# Patient Record
Sex: MALE | Race: WHITE
[De-identification: names, ages, dates, MRNs, and addresses within clinical notes are randomized per-mention and may not be internally consistent; named-entity substitution may affect disease eponyms.]

---

## 2019-10-22 ENCOUNTER — HOSPITAL ENCOUNTER (EMERGENCY)
Dept: HOSPITAL 97 - ER | Age: 74
Discharge: HOME | End: 2019-10-22
Payer: COMMERCIAL

## 2019-10-22 VITALS — OXYGEN SATURATION: 100 %

## 2019-10-22 VITALS — TEMPERATURE: 98.6 F

## 2019-10-22 VITALS — DIASTOLIC BLOOD PRESSURE: 65 MMHG | SYSTOLIC BLOOD PRESSURE: 130 MMHG

## 2019-10-22 DIAGNOSIS — Z79.01: ICD-10-CM

## 2019-10-22 DIAGNOSIS — Z86.73: ICD-10-CM

## 2019-10-22 DIAGNOSIS — R20.2: Primary | ICD-10-CM

## 2019-10-22 LAB
BUN BLD-MCNC: 14 MG/DL (ref 7–18)
GLUCOSE SERPLBLD-MCNC: 94 MG/DL (ref 74–106)
HCT VFR BLD CALC: 38.5 % (ref 39.6–49)
LYMPHOCYTES # SPEC AUTO: 1.2 K/UL (ref 0.7–4.9)
MAGNESIUM SERPL-MCNC: 2.1 MG/DL (ref 1.8–2.4)
PMV BLD: 7.4 FL (ref 7.6–11.3)
POTASSIUM SERPL-SCNC: 4.2 MMOL/L (ref 3.5–5.1)
RBC # BLD: 4.49 M/UL (ref 4.33–5.43)
TROPONIN (EMERG DEPT USE ONLY): < 0.02 NG/ML (ref 0–0.04)

## 2019-10-22 PROCEDURE — 93005 ELECTROCARDIOGRAM TRACING: CPT

## 2019-10-22 PROCEDURE — 84484 ASSAY OF TROPONIN QUANT: CPT

## 2019-10-22 PROCEDURE — 70450 CT HEAD/BRAIN W/O DYE: CPT

## 2019-10-22 PROCEDURE — 85025 COMPLETE CBC W/AUTO DIFF WBC: CPT

## 2019-10-22 PROCEDURE — 36415 COLL VENOUS BLD VENIPUNCTURE: CPT

## 2019-10-22 PROCEDURE — 99284 EMERGENCY DEPT VISIT MOD MDM: CPT

## 2019-10-22 PROCEDURE — 83735 ASSAY OF MAGNESIUM: CPT

## 2019-10-22 PROCEDURE — 70551 MRI BRAIN STEM W/O DYE: CPT

## 2019-10-22 PROCEDURE — 80048 BASIC METABOLIC PNL TOTAL CA: CPT

## 2019-10-22 PROCEDURE — 72125 CT NECK SPINE W/O DYE: CPT

## 2019-10-22 NOTE — RAD REPORT
EXAM DESCRIPTION:  MRI - Brain Wo Cont - 10/22/2019 6:02 pm

 

CLINICAL HISTORY:   Right arm numbness

 

COMPARISON:  Head CT October 22, 2018

 

TECHNIQUE:  Axial, sagittal, and coronal magnetic images of the brain were obtained. Contrast was not
 requested

 

FINDINGS:  Large area of cystic encephalomalacia left occipital lobe secondary to an old infarct.

 

Diffusion-weighted/ADC mapping does not reveal evidence of acute infarction.

 

The ventricles are normal caliber.

 

An extra-axial fluid collection is not present

 

The sinuses and mastoids are clear.

 

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

## 2019-10-22 NOTE — ER
Nurse's Notes                                                                                     

 [facility]                                                                 

Name: Anthony Simental                                                                                

Age: 73 yrs                                                                                       

Sex: Male                                                                                         

: 1945                                                                                   

MRN: [id number]                                                                                   

Arrival Date: 10/22/2019                                                                          

Time: 15:52                                                                                       

Account#: [id number]                                                                            

Bed 26                                                                                            

Private MD:                                                                                       

Diagnosis: Paresthesia of skin                                                                    

                                                                                                  

Presentation:                                                                                     

10/22                                                                                             

15:57 Presenting complaint: Patient states: for the last 3-4 days I have been having tingling la1 

      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       

      few years ago. VAN negative, taking warfarin. Transition of care: patient was not           

      received from another setting of care. Onset of symptoms was 2019. Risk         

      Assessment: Do you want to hurt yourself or someone else? Patient reports no desire to      

      harm self or others. Initial Sepsis Screen: Does the patient meet any 2 criteria? No.       

      Patient's initial sepsis screen is negative. Does the patient have a suspected source       

      of infection? No. Patient's initial sepsis screen is negative. Care prior to arrival:       

      None.                                                                                       

15:57 Method Of Arrival: Ambulatory                                                           la1 

15:57 Acuity: JUAN C 3                                                                           la1 

                                                                                                  

Historical:                                                                                       

- Allergies:                                                                                      

15:58 No Known Allergies;                                                                     la1 

- PMHx:                                                                                           

15:58 CVA;                                                                                    la1 

                                                                                                  

- Immunization history:: Adult Immunizations up to date.                                          

- Social history:: Smoking status: Patient/guardian denies using tobacco.                         

- Ebola Screening: : No symptoms or risks identified at this time.                                

- Family history:: not pertinent.                                                                 

- Hospitalizations: : No recent hospitalization is reported.                                      

                                                                                                  

                                                                                                  

Screenin:11 Abuse screen: Denies threats or abuse. Denies injuries from another. Nutritional        rv  

      screening: No deficits noted. Tuberculosis screening: No symptoms or risk factors           

      identified. Fall Risk None identified.                                                      

                                                                                                  

Assessment:                                                                                       

17:10 General: Appears in no apparent distress. comfortable, Behavior is calm, cooperative.   rv  

      Pain: Denies pain. Neuro: Level of Consciousness is awake, alert, obeys commands,           

      Oriented to person, place, time, situation. Neuro: Reports tingling sensation, right.       

      Cardiovascular: Patient's skin is warm and dry. Respiratory: Airway is patent. GI: No       

      signs and/or symptoms were reported involving the gastrointestinal system. : No signs     

      and/or symptoms were reported regarding the genitourinary system. EENT: No signs and/or     

      symptoms were reported regarding the EENT system. Derm: Skin is intact.                     

      Musculoskeletal: No signs and/or symptoms reported regarding the musculoskeletal system.    

18:34 Reassessment: Patient appears in no apparent distress at this time. Patient and/or      rv  

      family updated on plan of care and expected duration. Pain level reassessed. Patient is     

      alert, oriented x 3, equal unlabored respirations, skin warm/dry/pink.                      

18:34 General: Appears in no apparent distress. comfortable, Behavior is calm, cooperative.   rv  

      Neuro: No deficits noted.                                                                   

                                                                                                  

Vital Signs:                                                                                      

15:59  / 68; Pulse 80; Resp 16; Temp 98.6; Pulse Ox 100% on R/A; Weight 140.61 kg;      la1 

      Height 6 ft. 0 in. (182.88 cm);                                                             

17:12  / 64; Pulse 62; Resp 9; Pulse Ox 96% on R/A;                                     rv  

18:00  / 66; Pulse 65; Resp 15; Pulse Ox 100% on R/A;                                   rv  

18:30  / 65; Pulse 64; Resp 16; Pulse Ox 100% on R/A;                                   rv  

15:59 Body Mass Index 42.04 (140.61 kg, 182.88 cm)                                            la1 

                                                                                                  

ED Course:                                                                                        

15:52 Patient arrived in ED.                                                                  as  

15:58 Triage completed.                                                                       la1 

15:59 Arm band placed on right wrist.                                                         la1 

16:31 Immanuel Robin MD is Attending Physician.                                                rn  

16:32 Carlos Rosenbaum RN is Primary Nurse.                                                  rv  

17:03 EKG done, by EKG tech. reviewed by Immanuel Robin MD.                                      sm3 

17:11 Patient has correct armband on for positive identification. Bed in low position. Call   rv  

      light in reach. Side rails up X 1. Cardiac monitor on. Pulse ox on. NIBP on.                

17:11 Inserted saline lock: 20 gauge in left antecubital area, using aseptic technique. Blood rv  

      collected.                                                                                  

17:40 CT Head C Spine In Process Unspecified.                                                 EDMS

17:57 Brain Wo Cont MRI In Process Unspecified.                                               EDMS

18:35 No provider procedures requiring assistance completed. IV discontinued, intact,         rv  

      bleeding controlled, No redness/swelling at site. Pressure dressing applied.                

                                                                                                  

Administered Medications:                                                                         

No medications were administered                                                                  

                                                                                                  

                                                                                                  

Outcome:                                                                                          

18:28 Discharge ordered by MD.                                                                rn  

18:35 Discharged to home ambulatory.                                                          rv  

18:35 Condition: good                                                                             

18:35 Discharge instructions given to patient, Instructed on discharge instructions, follow       

      up and referral plans. medication usage, Demonstrated understanding of instructions,        

      follow-up care, medications, Prescriptions given X 1.                                       

18:39 Patient left the ED.                                                                    rv  

                                                                                                  

Signatures:                                                                                       

Dispatcher [employer]                           Leidy Silver Roman, MD MD rn Attema, Lee, RN                         RN   Mary Comer                              3                                                  

Carlos Rosenbaum RN                    RN   rv                                                   

                                                                                                  

**************************************************************************************************

## 2019-10-22 NOTE — EDPHYS
Physician Documentation                                                                           

 [facility]                                                                 

Name: Anthony Simental                                                                                

Age: 73 yrs                                                                                       

Sex: Male                                                                                         

: 1945                                                                                   

MRN: [id number]                                                                                   

Arrival Date: 10/22/2019                                                                          

Time: 15:52                                                                                       

Account#: [id number]                                                                            

Bed 26                                                                                            

Private MD:                                                                                       

ED Physician Immanuel Robin                                                                         

HPI:                                                                                              

10/22                                                                                             

16:47 This 73 yrs old  Male presents to ER via Ambulatory with complaints of r side  rn  

      tingling.                                                                                   

16:47 The patient presents to the emergency department with paresthesias of the right lower   rn  

      extremity, that is mild, right upper extremity, that is mild. Onset: The                    

      symptoms/episode began/occurred 3 day(s) ago. Context: occurred while the patient was       

      at rest. Severity of symptoms: At their worst the symptoms were mild in the emergency       

      department the symptoms are unchanged. Current symptoms: paresthesias. The patient has      

      experienced a previous episode. The patient has been recently seen by a physician:.         

      Reports right arm and leg tingling/tightness, over last 3 days, reports always there to     

      an extent but seems to "move around", denies chest pain/sob/head injury. Takes warfarin     

      for several previous strokes, only numbness to right leg is residual. Reports has never     

      had it affect right arm before. No weakness. No coordination issues. .                      

                                                                                                  

Historical:                                                                                       

- Allergies:                                                                                      

15:58 No Known Allergies;                                                                     la1 

- PMHx:                                                                                           

15:58 CVA;                                                                                    la1 

                                                                                                  

- Immunization history:: Adult Immunizations up to date.                                          

- Social history:: Smoking status: Patient/guardian denies using tobacco.                         

- Ebola Screening: : No symptoms or risks identified at this time.                                

- Family history:: not pertinent.                                                                 

- Hospitalizations: : No recent hospitalization is reported.                                      

                                                                                                  

                                                                                                  

ROS:                                                                                              

16:50 Constitutional: Negative for fever, chills, and weight loss, Eyes: Negative for injury, rn  

      pain, redness, and discharge, Neck: Negative for injury, pain, and swelling,                

      Cardiovascular: Negative for chest pain, palpitations, and edema, Respiratory: Negative     

      for shortness of breath, cough, wheezing, and pleuritic chest pain, Abdomen/GI:             

      Negative for abdominal pain, nausea, vomiting, diarrhea, and constipation,                  

      MS/Extremity: Negative for injury and deformity, Neuro: Negative for headache,              

      weakness, and seizure.                                                                      

                                                                                                  

Exam:                                                                                             

16:50 Constitutional:  This is a well developed, well nourished patient who is awake, alert,  rn  

      and in no acute distress. Head/Face:  Normocephalic, atraumatic. ENT:  MMM Neck:            

      Trachea midline, no thyromegaly or masses palpated, and no cervical lymphadenopathy.        

      Supple, full range of motion without nuchal rigidity, or vertebral point tenderness.        

      No Meningismus. Cardiovascular:  Regular rate and rhythm.  No pulse deficits.               

      Respiratory:  No increased work of breathing, no retractions or nasal flaring.              

      Abdomen/GI:  soft, non-tender, + moderate ventral hernia  MS/ Extremity:  Pulses equal,     

      no cyanosis.  Neurovascular intact.  Full, normal range of motion.  Equal                   

      circumference. Neuro:  Awake and alert, GCS 15, oriented to person, place, time, and        

      situation.  Cranial nerves II-XII grossly intact.  Motor strength 5/5 in all                

      extremities. Mild decreased sensation to soft touch RUE/RLE.  Cerebellar exam normal.       

      Normal gait.                                                                                

18:17 ECG was reviewed by the Attending Physician.                                            rn  

                                                                                                  

Vital Signs:                                                                                      

15:59  / 68; Pulse 80; Resp 16; Temp 98.6; Pulse Ox 100% on R/A; Weight 140.61 kg;      la1 

      Height 6 ft. 0 in. (182.88 cm);                                                             

17:12  / 64; Pulse 62; Resp 9; Pulse Ox 96% on R/A;                                     rv  

18:00  / 66; Pulse 65; Resp 15; Pulse Ox 100% on R/A;                                   rv  

18:30  / 65; Pulse 64; Resp 16; Pulse Ox 100% on R/A;                                   rv  

15:59 Body Mass Index 42.04 (140.61 kg, 182.88 cm)                                            la1 

                                                                                                  

MDM:                                                                                              

16:31 Patient medically screened.                                                             rn  

18:25 Data reviewed: vital signs, nurses notes, lab test result(s), EKG, radiologic studies,  rn  

      CT scan, MRI, and as a result, I will discharge patient. Counseling: I had a detailed       

      discussion with the patient and/or guardian regarding: the historical points, exam          

      findings, and any diagnostic results supporting the discharge/admit diagnosis, lab          

      results, radiology results, the need for outpatient follow up, to return to the             

      emergency department if symptoms worsen or persist or if there are any questions or         

      concerns that arise at home. Response to treatment: the patient's symptoms have mildly      

      improved after treatment, and as a result, I will discharge patient. Special                

      discussion: I discussed with the patient/guardian in detail that at this point there is     

      no indication for admission to the hospital. It is understood, however, that if the         

      symptoms persist or worsen the patient needs to return immediately for re-evaluation.       

      Based on the history and exam findings, there is no indication for further emergent         

      testing or inpatient evaluation. I discussed with the patient/guardian the need to see      

      the neurologist for further evaluation of the symptoms. ED course: Pt with vague            

      symptoms that patient states not same as previous strokes. CT head and cspine without       

      acute findings, + spondylosis of cervical vertebrae that could explain right arm            

      paresthesias. Patient does states have residual symptoms in right leg from previous         

      CVA. MRI negative for acute stroke. Will dc home with steroids and neuro f/u for            

      possible neuropathy or radiculopathy. .                                                     

                                                                                                  

10/22                                                                                             

16:44 Order name: Troponin (emerg Dept Use Only); Complete Time: 18:16                        rn  

10/22                                                                                             

16:44 Order name: Basic Metabolic Panel; Complete Time: 18:16                                 rn  

10/22                                                                                             

16:44 Order name: CBC with Diff; Complete Time: 18:16                                         rn  

10/22                                                                                             

16:44 Order name: Magnesium; Complete Time: 18:16                                             rn  

10/22                                                                                             

16:44 Order name: EKG; Complete Time: 16:44                                                   rn  

10/22                                                                                             

16:44 Order name: Cardiac monitoring; Complete Time: 17:06                                    rn  

10/22                                                                                             

16:44 Order name: EKG - Nurse/Tech; Complete Time: 17:06                                      rn  

10/22                                                                                             

16:44 Order name: IV Saline Lock; Complete Time: 17:06                                        rn  

10/22                                                                                             

16:44 Order name: O2 Sat Monitoring; Complete Time: 17:06                                     rn  

10/22                                                                                             

16:44 Order name: Brain Wo Cont MRI; Complete Time: 18:25                                     rn  

10/22                                                                                             

16:50 Order name: CT Head C Spine; Complete Time: 18:16                                       rn  

                                                                                                  

EC:17 Rate is 66 beats/min. Rhythm is regular. QRS Axis is Normal. WV interval is normal. QRS rn  

      interval is normal. QT interval is normal. No Q waves. T waves are Normal. No ST            

      changes noted. Clinical impression: NSR w/ Non-specific ST/T Changes. Interpreted by        

      me. Reviewed by me.                                                                         

                                                                                                  

Administered Medications:                                                                         

No medications were administered                                                                  

                                                                                                  

                                                                                                  

Disposition:                                                                                      

10/22/19 18:28 Discharged to Home. Impression: Paresthesia of skin.                               

- Condition is Stable.                                                                            

- Discharge Instructions: Paresthesia.                                                            

- Prescriptions for Medrol (Dave) 4 mg Oral Tablets, Dose Pack - take 1 tablet by ORAL             

  route as directed - follow package instructions; 1 packet.                                      

- Medication Reconciliation Form, Thank You Letter, Antibiotic Education, Prescription            

  Opioid Use form.                                                                                

- Follow up: Private Physician; When: As needed; Reason: Recheck today's complaints,              

  Re-evaluation by your physician.                                                                

- Problem is new.                                                                                 

- Symptoms have improved.                                                                         

                                                                                                  

                                                                                                  

                                                                                                  

Signatures:                                                                                       

Dispatcher [employer]                           EDMS                                                 

Immanuel Robin MD MD   rn                                                   

Jett Smith RN                         RN   la1                                                  

Carlos Rosenbaum RN                    RN   rv                                                   

                                                                                                  

Corrections: (The following items were deleted from the chart)                                    

16:53 16:44 PROTIME (+INR)+COAG.LAB.BRZ ordered. EDMS                                         EDMS

16:54 16:45 Head Brain Wo Cont+CT.RAD.BRZ ordered. [facility]                                       EDMS

18:39 18:28 10/22/2019 18:28 Discharged to Home. Impression: Paresthesia of skin. Condition   rv  

      is Stable. Forms are Medication Reconciliation Form, Thank You Letter, Antibiotic           

      Education, Prescription Opioid Use. Follow up: Private Physician; When: As needed;          

      Reason: Recheck today's complaints, Re-evaluation by your physician. Problem is new.        

      Symptoms have improved. rn                                                                  

                                                                                                  

**************************************************************************************************

## 2019-10-22 NOTE — EKG
Test Date:    2019-10-22               Test Time:    16:51:21

Technician:   THUY                                     

                                                     

MEASUREMENT RESULTS:                                       

Intervals:                                           

Rate:         66                                     

NJ:           174                                    

QRSD:         70                                     

QT:           400                                    

QTc:          419                                    

Axis:                                                

P:            23                                     

NJ:           174                                    

QRS:          28                                     

T:            52                                     

                                                     

INTERPRETIVE STATEMENTS:                                       

                                                     

Normal sinus rhythm

Low voltage QRS

Borderline ECG

Compared to ECG 05/28/2015 10:15:56

Low QRS voltage now present

Ventricular premature complex(es) no longer present

T-wave abnormality no longer present



Electronically Signed On 10-22-19 17:30:21 CDT by Suraj Thibodeaux

## 2019-10-22 NOTE — RAD REPORT
EXAM DESCRIPTION:  CT - Head C Spine Mpr Wo Con - 10/22/2019 5:39 pm

 

CLINICAL HISTORY:  Right sided numbness

 

COMPARISON:  2015 MRI brain

 

TECHNIQUE:  Computed axial tomography of the head and cervical spine was obtained.

 

Sagittal and coronal reconstruction was performed.

 

All CT scans are performed using dose optimization technique as appropriate and may include automated
 exposure control or mA/KV adjustment according to patient size.

 

FINDINGS:  Large area of cystic encephalomalacia left cerebellum secondary to an old infarction.

 

An intracranial bleed is not seen. Ventricles are normal caliber. No extra-axial fluid collection.

 

Fluid within the sinuses/mastoids is not noted

 

A cervical fracture is not visualized. No dislocation is noted. Spondylosis C5-6 and C6-7. Moderate n
arrowing of the right neural foramina C5-6. Moderate narrowing of the neural foramina bilaterally C6-
7

 

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

 

A cervical fracture is not visualized.

 

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

## 2020-02-01 ENCOUNTER — HOSPITAL ENCOUNTER (OUTPATIENT)
Dept: HOSPITAL 97 - ER | Age: 75
Setting detail: OBSERVATION
LOS: 1 days | Discharge: HOME | End: 2020-02-02
Attending: INTERNAL MEDICINE | Admitting: INTERNAL MEDICINE
Payer: COMMERCIAL

## 2020-02-01 VITALS — BODY MASS INDEX: 42.3 KG/M2

## 2020-02-01 VITALS — OXYGEN SATURATION: 95 %

## 2020-02-01 DIAGNOSIS — I10: ICD-10-CM

## 2020-02-01 DIAGNOSIS — L03.115: Primary | ICD-10-CM

## 2020-02-01 DIAGNOSIS — I69.351: ICD-10-CM

## 2020-02-01 LAB
ALBUMIN SERPL BCP-MCNC: 3.4 G/DL (ref 3.4–5)
ALP SERPL-CCNC: 80 U/L (ref 45–117)
ALT SERPL W P-5'-P-CCNC: 21 U/L (ref 12–78)
AST SERPL W P-5'-P-CCNC: 18 U/L (ref 15–37)
BUN BLD-MCNC: 21 MG/DL (ref 7–18)
GLUCOSE SERPLBLD-MCNC: 96 MG/DL (ref 74–106)
HCT VFR BLD CALC: 40.4 % (ref 39.6–49)
INR BLD: 2.78
INR BLD: 2.83
LYMPHOCYTES # SPEC AUTO: 1 K/UL (ref 0.7–4.9)
MAGNESIUM SERPL-MCNC: 2 MG/DL (ref 1.8–2.4)
NT-PROBNP SERPL-MCNC: 210 PG/ML (ref ?–125)
PMV BLD: 7.6 FL (ref 7.6–11.3)
POTASSIUM SERPL-SCNC: 4.1 MMOL/L (ref 3.5–5.1)
RBC # BLD: 4.64 M/UL (ref 4.33–5.43)
TROPONIN (EMERG DEPT USE ONLY): < 0.02 NG/ML (ref 0–0.04)

## 2020-02-01 PROCEDURE — 93005 ELECTROCARDIOGRAM TRACING: CPT

## 2020-02-01 PROCEDURE — 85610 PROTHROMBIN TIME: CPT

## 2020-02-01 PROCEDURE — 80053 COMPREHEN METABOLIC PANEL: CPT

## 2020-02-01 PROCEDURE — 84484 ASSAY OF TROPONIN QUANT: CPT

## 2020-02-01 PROCEDURE — 96368 THER/DIAG CONCURRENT INF: CPT

## 2020-02-01 PROCEDURE — 96365 THER/PROPH/DIAG IV INF INIT: CPT

## 2020-02-01 PROCEDURE — 81003 URINALYSIS AUTO W/O SCOPE: CPT

## 2020-02-01 PROCEDURE — 80048 BASIC METABOLIC PNL TOTAL CA: CPT

## 2020-02-01 PROCEDURE — 87040 BLOOD CULTURE FOR BACTERIA: CPT

## 2020-02-01 PROCEDURE — 84145 PROCALCITONIN (PCT): CPT

## 2020-02-01 PROCEDURE — 93970 EXTREMITY STUDY: CPT

## 2020-02-01 PROCEDURE — 71045 X-RAY EXAM CHEST 1 VIEW: CPT

## 2020-02-01 PROCEDURE — 36415 COLL VENOUS BLD VENIPUNCTURE: CPT

## 2020-02-01 PROCEDURE — 99285 EMERGENCY DEPT VISIT HI MDM: CPT

## 2020-02-01 PROCEDURE — 83880 ASSAY OF NATRIURETIC PEPTIDE: CPT

## 2020-02-01 PROCEDURE — 85025 COMPLETE CBC W/AUTO DIFF WBC: CPT

## 2020-02-01 PROCEDURE — 80076 HEPATIC FUNCTION PANEL: CPT

## 2020-02-01 PROCEDURE — 96366 THER/PROPH/DIAG IV INF ADDON: CPT

## 2020-02-01 PROCEDURE — 83735 ASSAY OF MAGNESIUM: CPT

## 2020-02-01 RX ADMIN — FAMOTIDINE SCH MG: 20 TABLET, FILM COATED ORAL at 20:34

## 2020-02-01 RX ADMIN — Medication SCH ML: at 20:33

## 2020-02-01 NOTE — ER
Nurse's Notes                                                                                     

 [facility]                                                                 

Name: Anthony Simental                                                                                

Age: 74 yrs                                                                                       

Sex: Male                                                                                         

: 1945                                                                                   

MRN: [id number]                                                                                   

Arrival Date: 2020                                                                          

Time: 14:45                                                                                       

Account#: [id number]                                                                            

Bed 24                                                                                            

Private MD: Ketan Messina T                                                                        

Diagnosis: Cellulitis and acute lymphangitis of other parts of limb-failed outpt                  

  treatment;Obesity, unspecified;Unspecified kidney failure                                       

                                                                                                  

Presentation:                                                                                     

                                                                                             

14:54 Presenting complaint: Worsening RLE redness and swelling x 1 week. Transition of care:  hb  

      patient was not received from another setting of care. Onset of symptoms was 2020. Risk Assessment: Do you want to hurt yourself or someone else? Patient            

      reports no desire to harm self or others. Care prior to arrival: None.                      

14:54 Method Of Arrival: Ambulatory                                                             

14:54 Acuity: JUAN C 3                                                                           hb  

14:54 Initial Sepsis Screen: Does the patient meet any 2 criteria? No. Patient's initial      hb  

      sepsis screen is negative. Does the patient have a suspected source of infection? No.       

      Patient's initial sepsis screen is negative.                                                

                                                                                                  

Historical:                                                                                       

- Allergies:                                                                                      

14:56 No Known Allergies;                                                                     hb  

- PMHx:                                                                                           

14:56 CVA;                                                                                    hb  

                                                                                                  

- Immunization history:: Adult Immunizations up to date.                                          

- Coronavirus screen:: The patient has NOT traveled to [place], [place], or [place] in the           

  past 14 days. The patient has NOT had contact with known/suspected case of                      

  Coronavirus? Proceed with normal triage procedures.                                             

- Social history:: Smoking status: Patient denies any tobacco usage or history of.                

- Family history:: not pertinent.                                                                 

- Ebola Screening: : No symptoms or risks identified at this time.                                

                                                                                                  

                                                                                                  

Screening:                                                                                        

15:06 Abuse screen: Denies threats or abuse. Denies injuries from another. Nutritional        mg2 

      screening: No deficits noted. Tuberculosis screening: No symptoms or risk factors           

      identified.                                                                                 

16:35 Fall Risk IV access (20 points).                                                        mg2 

                                                                                                  

Assessment:                                                                                       

16:32 General: Appears in no apparent distress. comfortable, Behavior is calm, cooperative.   mg2 

      Pain: Complains of pain in right leg. Neuro: Level of Consciousness is awake, alert,        

      obeys commands, Oriented to person, place, time, situation. Cardiovascular: Capillary       

      refill < 3 seconds Patient's skin is warm and dry. Respiratory: Airway is patent            

      Respiratory effort is even, unlabored, Respiratory pattern is regular, symmetrical. GI:     

      No signs and/or symptoms were reported involving the gastrointestinal system. : No        

      signs and/or symptoms were reported regarding the genitourinary system. EENT: No signs      

      and/or symptoms were reported regarding the EENT system. Derm: Skin is intact, is           

      healthy with good turgor, Skin is pink, warm \T\ dry. normal, Rash noted that is on right   

      leg. Musculoskeletal: Circulation, motion, and sensation intact. Capillary refill < 3       

      seconds.                                                                                    

17:20 Reassessment: Patient appears in no apparent distress at this time. Patient and/or      mg2 

      family updated on plan of care and expected duration. Pain level reassessed. Patient is     

      alert, oriented x 3, equal unlabored respirations, skin warm/dry/pink. hospitalist came     

      and advised the patient for admission. patient agreed.                                      

                                                                                                  

Vital Signs:                                                                                      

14:55  / 68; Pulse 84; Resp 16; Temp 97.5; Pulse Ox 97% on R/A; Weight 140.61 kg;       hb  

      Height 6 ft. (182.88 cm); Pain 3/10;                                                        

16:00  / 80; Pulse 71; Resp 17; Temp 97; Pulse Ox 98% on R/A;                           mg2 

17:32  / 55; Pulse 63; Resp 18; Pulse Ox 100% on R/A;                                   mg2 

18:20  / 60; Pulse 81; Resp 18; Temp 98; Pulse Ox 100% on R/A;                          mg2 

14:55 Body Mass Index 42.04 (140.61 kg, 182.88 cm)                                            hb  

                                                                                                  

ED Course:                                                                                        

14:45 Patient arrived in ED.                                                                  mr  

14:45 Ketan Messina MD is Private Physician.                                                   mr  

14:55 Triage completed.                                                                       hb  

14:56 Arm band placed on.                                                                     hb  

15:00 Homar Olson MD is Attending Physician.                                             cindi 

15:02 Kye Young, RN is Primary Nurse.                                                  mg2 

15:40 Inserted saline lock: 20 gauge in right antecubital area, using aseptic technique.      wh  

      Blood collected.                                                                            

15:58 XRAY Chest (1 view) In Process Unspecified.                                             EDMS

16:00 Hunter Zamora is Hospitalizing Provider.                                               cindi 

16:01 Michael Gordillo MD is Hospitalizing Provider.                                          cindi 

16:35 Patient has correct armband on for positive identification.                             mg2 

17:11 US Extremity Venous W Compression [place] In Process Unspecified.                           EDMS

18:33 No provider procedures requiring assistance completed. Patient admitted, IV remains in  mg2 

      place.                                                                                      

                                                                                                  

Administered Medications:                                                                         

16:10 Drug: NS 0.9% 1000 ml Route: IV; Rate: 125 ml/hr; Site: right antecubital;                

18:30 Follow up: Response: No adverse reaction; IV Status: Infusion continued upon admission  mg2 

16:10 Drug: Zosyn 3.375 grams Route: IVPB; Infused Over: 60 mins; Site: right antecubital;      

18:29 Follow up: Response: No adverse reaction; IV Status: Completed infusion                 mg2 

17:14 Drug: vancoMYCIN 2 grams Route: IVPB; Rate: calculated rate; Site: right antecubital;   [facility] 

18:30 Follow up: Response: No adverse reaction; IV Status: Infusion continued upon admission  mg2 

                                                                                                  

                                                                                                  

Outcome:                                                                                          

16:01 Decision to Hospitalize by Provider.                                                    cindi 

18:34 Admitted to Med/surg accompanied by tech, via wheelchair, room 224, with chart, Report  mg2 

      called to  JOHNY Vasquez                                                                        

18:34 Condition: stable                                                                           

18:34 Instructed on the need for admit, Demonstrated understanding of instructions.               

18:56 Patient left the ED.                                                                    mg2 

                                                                                                  

Signatures:                                                                                       

Dispatcher [employer]                           Homar Walker MD MD cha Rivera, Mary                                 mr                                                   

Dina Rand RN RN hb Habalo, Winsy wh Gardose, Michele, RN RN   mg2                                                  

                                                                                                  

**************************************************************************************************

## 2020-02-01 NOTE — EDPHYS
Physician Documentation                                                                           

 [facility]                                                                 

Name: Anthony Simental                                                                                

Age: 74 yrs                                                                                       

Sex: Male                                                                                         

: 1945                                                                                   

MRN: [id number]                                                                                   

Arrival Date: 2020                                                                          

Time: 14:45                                                                                       

Account#: [id number]                                                                            

Bed 24                                                                                            

Private MD: Ketan Messina T                                                                        

ED Physician Homar Olson                                                                      

HPI:                                                                                              

                                                                                             

15:26 This 74 yrs old  Male presents to ER via Ambulatory with complaints of Leg     cindi 

      Infection.                                                                                  

15:26 The patient presents with decreased range of motion, pain, swelling, tenderness. The    cindi 

      complaints affect the lateral aspect of right calf, right calf, medial aspect of right      

      calf and right shin. Context: The problem was sustained at home. Onset: The                 

      symptoms/episode began/occurred 5 day(s) ago. Modifying factors: The symptoms are           

      alleviated by elevating leg, the symptoms are aggravated by movement. Associated signs      

      and symptoms: The patient has no apparent associated signs or symptoms. Severity of         

      symptoms: At their worst the symptoms were moderate, in the emergency department the        

      symptoms are unchanged. The patient has experienced similar episodes in the past,           

      several times.                                                                              

                                                                                                  

Historical:                                                                                       

- Allergies:                                                                                      

14:56 No Known Allergies;                                                                     hb  

- PMHx:                                                                                           

14:56 CVA;                                                                                    hb  

                                                                                                  

- Immunization history:: Adult Immunizations up to date.                                          

- Coronavirus screen:: The patient has NOT traveled to [place], [place], or [place] in the           

  past 14 days. The patient has NOT had contact with known/suspected case of                      

  Coronavirus? Proceed with normal triage procedures.                                             

- Social history:: Smoking status: Patient denies any tobacco usage or history of.                

- Family history:: not pertinent.                                                                 

- Ebola Screening: : No symptoms or risks identified at this time.                                

                                                                                                  

                                                                                                  

ROS:                                                                                              

15:27 Constitutional: Negative for fever, chills, and weight loss, Eyes: Negative for injury, cindi 

      pain, redness, and discharge, ENT: Negative for injury, pain, and discharge, Neck:          

      Negative for injury, pain, and swelling, Cardiovascular: Negative for chest pain,           

      palpitations, and edema, Respiratory: Negative for shortness of breath, cough,              

      wheezing, and pleuritic chest pain, Abdomen/GI: Negative for abdominal pain, nausea,        

      vomiting, diarrhea, and constipation, Back: Negative for injury and pain, : Negative      

      for injury, bleeding, discharge, and swelling, Skin: Negative for injury, rash, and         

      discoloration, Neuro: Negative for headache, weakness, numbness, tingling, and seizure,     

      Allergy/Immunology: Negative for hives, rash, and allergies, Endocrine: Negative for        

      neck swelling, polydipsia, polyuria, polyphagia, and marked weight changes,                 

      Hematologic/Lymphatic: Negative for swollen nodes, abnormal bleeding, and unusual           

      bruising.                                                                                   

15:27 MS/extremity: Positive for decreased range of motion, pain, swelling, tenderness, of        

      the right leg.                                                                              

                                                                                                  

Exam:                                                                                             

15:27 Constitutional:  This is a well developed, well nourished patient who is awake, alert,  cindi 

      and in no acute distress. Head/Face:  Normocephalic, atraumatic. Eyes:  Pupils equal        

      round and reactive to light, extra-ocular motions intact.  Lids and lashes normal.          

      Conjunctiva and sclera are non-icteric and not injected.  Cornea within normal limits.      

      Periorbital areas with no swelling, redness, or edema. ENT:  Nares patent. No nasal         

      discharge, no septal abnormalities noted.  Tympanic membranes are normal and external       

      auditory canals are clear.  Oropharynx with no redness, swelling, or masses, exudates,      

      or evidence of obstruction, uvula midline.  Mucous membranes moist. Neck:  Trachea          

      midline, no thyromegaly or masses palpated, and no cervical lymphadenopathy.  Supple,       

      full range of motion without nuchal rigidity, or vertebral point tenderness.  No            

      Meningismus. Chest/axilla:  Normal chest wall appearance and motion.  Nontender with no     

      deformity.  No lesions are appreciated. Cardiovascular:  Regular rate and rhythm with a     

      normal S1 and S2.  No gallops, murmurs, or rubs.  Normal PMI, no JVD.  No pulse             

      deficits. Respiratory:  Lungs have equal breath sounds bilaterally, clear to                

      auscultation and percussion.  No rales, rhonchi or wheezes noted.  No increased work of     

      breathing, no retractions or nasal flaring. Abdomen/GI:  Soft, non-tender, with normal      

      bowel sounds.  No distension or tympany.  No guarding or rebound.  No evidence of           

      tenderness throughout. Back:  No spinal tenderness.  No costovertebral tenderness.          

      Full range of motion. Male :  Normal genitalia with no discharge or lesions. Skin:        

      Warm, dry with normal turgor.  Normal color with no rashes, no lesions, and no evidence     

      of cellulitis. Neuro:  Awake and alert, GCS 15, oriented to person, place, time, and        

      situation.  Cranial nerves II-XII grossly intact.  Motor strength 5/5 in all                

      extremities.  Sensory grossly intact.  Cerebellar exam normal.  Normal gait. Psych:         

      Awake, alert, with orientation to person, place and time.  Behavior, mood, and affect       

      are within normal limits.                                                                   

15:27 Musculoskeletal/extremity: Circulation is intact in all extremities. Sensation intact.      

      Compartment Syndrome exam of affected extremity: is normal. DVT Exam: pain, swelling,       

      tenderness, of the right leg, of the  lateral aspect of right calf, right calf, medial      

      aspect of right calf and right shin.                                                        

                                                                                                  

Vital Signs:                                                                                      

14:55  / 68; Pulse 84; Resp 16; Temp 97.5; Pulse Ox 97% on R/A; Weight 140.61 kg;       hb  

      Height 6 ft. (182.88 cm); Pain 3/10;                                                        

16:00  / 80; Pulse 71; Resp 17; Temp 97; Pulse Ox 98% on R/A;                           mg2 

17:32  / 55; Pulse 63; Resp 18; Pulse Ox 100% on R/A;                                   mg2 

18:20  / 60; Pulse 81; Resp 18; Temp 98; Pulse Ox 100% on R/A;                          mg2 

14:55 Body Mass Index 42.04 (140.61 kg, 182.88 cm)                                            hb  

                                                                                                  

MDM:                                                                                              

15:00 Patient medically screened.                                                             [facility] 

15:29 Data reviewed: vital signs, nurses notes, lab test result(s), EKG, radiologic studies,  cindi 

      plain films, ultrasound.                                                                    

                                                                                                  

                                                                                             

15:26 Order name: Basic Metabolic Panel                                                       [facility] 

                                                                                             

15:26 Order name: CBC with Diff                                                               [facility] 

                                                                                             

15:26 Order name: LFT's                                                                       [facility] 

                                                                                             

15:26 Order name: Magnesium                                                                   [facility] 

                                                                                             

15:26 Order name: NT PRO-BNP                                                                  [facility] 

                                                                                             

15:26 Order name: PT-INR                                                                      [facility] 

                                                                                             

15:26 Order name: Troponin (emerg Dept Use Only)                                              [facility] 

                                                                                             

15:26 Order name: Blood Culture Adult (2)                                                     [facility] 

                                                                                             

15:26 Order name: Procalcitonin                                                               [facility] 

                                                                                             

15:27 Order name: Basic Metabolic Panel; Complete Time: 18:19                                 [facility]

                                                                                             

15:27 Order name: CBC with Automated Diff; Complete Time: 18:19                               [facility]

                                                                                             

15:34 Order name: Liver (Hepatic) Function; Complete Time: 18:19                              [facility]

                                                                                             

15:35 Order name: Magnesium; Complete Time: 18:19                                             [facility]

                                                                                             

15:35 Order name: NT PRO-BNP; Complete Time: 18:19                                            [facility]

                                                                                             

15:26 Order name: XRAY Chest (1 view); Complete Time: 18:19                                   [facility] 

                                                                                             

15:26 Order name: EKG; Complete Time: 15:35                                                   [facility] 

                                                                                             

15:26 Order name: Cardiac monitoring; Complete Time: 16:10                                    [facility] 

                                                                                             

15:26 Order name: EKG - Nurse/Tech; Complete Time: 16:10                                      [facility] 

                                                                                             

15:26 Order name: IV Saline Lock; Complete Time: 16:11                                        [facility] 

                                                                                             

15:26 Order name: US Extremity Venous W Compression Cody; Complete Time: 18:19                 [facility] 

                                                                                             

15:35 Order name: Protime (+INR); Complete Time: 18:19                                        [facility]

                                                                                             

15:35 Order name: Troponin (Emerg Dept Use Only); Complete Time: 18:19                        [facility]

                                                                                             

15:35 Order name: Blood Culture                                                               [facility]

                                                                                             

15:35 Order name: Procalcitonin; Complete Time: 18:19                                         [facility]

                                                                                             

17:17 Order name: CONS Pharmacy Consult                                                       [facility]

                                                                                             

17:17 Order name: Heart Healthy                                                               [facility]

                                                                                             

17:17 Order name: Protime (+INR)                                                              [facility]

                                                                                             

15:26 Order name: Labs collected and sent; Complete Time: 16:11                               [facility] 

                                                                                             

15:26 Order name: O2 Per Protocol; Complete Time: 16:11                                       [facility] 

                                                                                             

15:26 Order name: O2 Sat Monitoring; Complete Time: 16:11                                     [facility] 

                                                                                                  

Administered Medications:                                                                         

16:10 Drug: NS 0.9% 1000 ml Route: IV; Rate: 125 ml/hr; Site: right antecubital;              wh  

18:30 Follow up: Response: No adverse reaction; IV Status: Infusion continued upon admission  mg2 

16:10 Drug: Zosyn 3.375 grams Route: IVPB; Infused Over: 60 mins; Site: right antecubital;    wh  

18:29 Follow up: Response: No adverse reaction; IV Status: Completed infusion                 mg2 

17:14 Drug: vancoMYCIN 2 grams Route: IVPB; Rate: calculated rate; Site: right antecubital;   mg2 

18:30 Follow up: Response: No adverse reaction; IV Status: Infusion continued upon admission  mg2 

                                                                                                  

                                                                                                  

Disposition:                                                                                      

20 16:01 Hospitalization ordered by Michael Gordillo for Inpatient Admission. Preliminary    

  diagnosis are Cellulitis and acute lymphangitis of other parts of limb -                        

  failed outpt treatment, Obesity, unspecified, Unspecified kidney failure.                       

- Bed requested for Telemetry/MedSurg (Inpatient).                                                

- Status is Inpatient Admission.                                                              mg2 

- Condition is Stable.                                                                            

- Problem is new.                                                                                 

- Symptoms have improved.                                                                         

UTI on Admission? No                                                                              

                                                                                                  

                                                                                                  

                                                                                                  

Signatures:                                                                                       

Dispatcher [employer]                           EDMS                                                 

Connie Leiva RN RN dw Anderson, Corey, MD MD cha Baxter, Heather, RN RN hb Habalo, Winsy wh Botello, Elizabeth eb Gardose, Michele, RN RN   mg2                                                  

                                                                                                  

Corrections: (The following items were deleted from the chart)                                    

16:45 16:01 Hospitalization Ordered by Michael Gordillo MD for Inpatient Admission. Preliminary eb  

      diagnosis is Cellulitis and acute lymphangitis of other parts of limb - failed outpt        

      treatment; Obesity, unspecified. Bed requested for Telemetry/MedSurg (Inpatient).           

      Status is Inpatient Admission. Condition is Stable. Problem is new. Symptoms have           

      improved. UTI on Admission? No. cindi                                                         

17:58 16:45 2020 16:01 Hospitalization Ordered by Michael Gordillo MD for Inpatient       dw  

      Admission. Preliminary diagnosis is Cellulitis and acute lymphangitis of other parts of     

      limb - failed outpt treatment; Obesity, unspecified. Bed requested for                      

      Telemetry/MedSurg (Inpatient). Status is Inpatient Admission. Condition is Stable.          

      Problem is new. Symptoms have improved. UTI on Admission? No. eb                            

18:21 17:58 2020 16:01 Hospitalization Ordered by Michael Gordillo MD for Inpatient       cindi 

      Admission. Preliminary diagnosis is Cellulitis and acute lymphangitis of other parts of     

      limb - failed outpt treatment; Obesity, unspecified. Bed requested for                      

      Telemetry/MedSurg (Inpatient). Status is Inpatient Admission. Condition is Stable.          

      Problem is new. Symptoms have improved. UTI on Admission? No. dw                            

18:56 18:21 2020 16:01 Hospitalization Ordered by Michael Gordillo MD for Inpatient       mg2 

      Admission. Preliminary diagnosis is Cellulitis and acute lymphangitis of other parts of     

      limb - failed outpt treatment; Obesity, unspecified; Unspecified kidney failure. Bed        

      requested for Telemetry/MedSurg (Inpatient). Status is Inpatient Admission. Condition       

      is Stable. Problem is new. Symptoms have improved. UTI on Admission? No. cindi                

                                                                                                  

**************************************************************************************************

## 2020-02-01 NOTE — RAD REPORT
EXAM DESCRIPTION:  US - Extrem Venous W Compress Cody - 2/1/2020 5:11 pm

 

CLINICAL HISTORY:  Pain;Swelling

Bilateral leg edema and swelling.

 

COMPARISON:  <Comparisons>

 

TECHNIQUE:  Real-time sonographic interrogation of the left and right lower extremity deep venous sys
tems was performed.

 

FINDINGS:  Normal compressibility, flow augmentation, phasic flow and spontaneous flow is identified 
in both the left and right lower extremity deep venous systems.

 

IMPRESSION:  No sonographic evidence of left or right lower extremity deep venous thrombosis.

## 2020-02-01 NOTE — RAD REPORT
EXAM DESCRIPTION:  RAD - Chest Single View - 2/1/2020 3:58 pm

 

CLINICAL HISTORY:  COUGH

Chest pain.

 

COMPARISON:  CHEST SINGLE VIEW dated 6/9/2015; CHEST SINGLE VIEW dated 5/28/2015; CHEST SINGLE VIEW d
ated 5/23/2015

 

FINDINGS:  Portable technique limits examination quality.

 

Mild linear subsegmental atelectasis is present left lung base. The lungs are otherwise clear. The he
art is normal in size. No displaced fractures.

 

IMPRESSION:  Mild linear subsegmental atelectasis left lung base.

## 2020-02-02 VITALS — SYSTOLIC BLOOD PRESSURE: 106 MMHG | TEMPERATURE: 97.1 F | DIASTOLIC BLOOD PRESSURE: 56 MMHG

## 2020-02-02 LAB
ALBUMIN SERPL BCP-MCNC: 3.2 G/DL (ref 3.4–5)
ALP SERPL-CCNC: 60 U/L (ref 45–117)
ALT SERPL W P-5'-P-CCNC: 17 U/L (ref 12–78)
AST SERPL W P-5'-P-CCNC: 16 U/L (ref 15–37)
BUN BLD-MCNC: 25 MG/DL (ref 7–18)
GLUCOSE SERPLBLD-MCNC: 100 MG/DL (ref 74–106)
HCT VFR BLD CALC: 37.8 % (ref 39.6–49)
LYMPHOCYTES # SPEC AUTO: 1 K/UL (ref 0.7–4.9)
PMV BLD: 7.5 FL (ref 7.6–11.3)
POTASSIUM SERPL-SCNC: 4.2 MMOL/L (ref 3.5–5.1)
RBC # BLD: 4.42 M/UL (ref 4.33–5.43)
UA DIPSTICK W REFLEX MICRO PNL UR: (no result)

## 2020-02-02 RX ADMIN — Medication SCH ML: at 07:41

## 2020-02-02 RX ADMIN — FAMOTIDINE SCH MG: 20 TABLET, FILM COATED ORAL at 07:42

## 2020-02-02 NOTE — P.DS
Admission Date: 02/01/20


Discharge Date: 02/02/20


Disposition: ROUTINE DISCHARGE


Discharge Condition: GOOD


Brief History of Present Illness: 





Patient with history of chronic lower extremity edema presented for new onsets 

redness over the right lower extremity.


Hospital Course: 





Patient on admission was diagnosed with right lower extremity cellulitis.  He 

has chronic lower extremity edema especially mall for the right side from 

previous CVA was felt to be due to venous insufficiency on causing his 

recurrent cellulitis.  Since he was previously on bactrim and rifampin started  

1 day prior to presentation, patient was continued on Rifmapin and added  

Omnicef.  He did not have any leukocytosis or elevated pro calcitonin was not 

felt to be septic.


He was also advised to start compression stockings.  Application on how to use 

Ace wrap was instructed


Vital Signs/Physical Exam: 














Temp Pulse Resp BP Pulse Ox


 


 97.9 F   82   18   103/53 L  95 


 


 02/02/20 08:00  02/02/20 08:00  02/02/20 08:00  02/02/20 08:00  02/02/20 08:00








General: Alert, In no apparent distress, Oriented x3


HEENT: Atraumatic, Normocephalic


Neck: Supple, 2+ carotid pulse no bruit


Respiratory: Clear to auscultation bilaterally, Normal air movement


Cardiovascular: Normal pulses, Regular rate/rhythm, Normal S1 S2, Edema


Gastrointestinal: Normal bowel sounds, Soft and benign, W/out succussion splash


Musculoskeletal: Swelling


Integumentary: Warmth (decreasing over RLE)


Neurological: Normal gait, Normal speech, Normal strength at 5/5 x4 extr


Laboratory Data at Discharge: 














WBC  7.1 K/uL (4.3-10.9)   02/02/20  05:54    


 


Hgb  12.8 g/dL (13.6-17.9)  L  02/02/20  05:54    


 


Hct  37.8 % (39.6-49.0)  L  02/02/20  05:54    


 


Plt Count  198 K/uL (152-406)   02/02/20  05:54    


 


PT  32.1 SECONDS (9.5-12.5)  H  02/01/20  19:50    


 


INR  2.83   02/01/20  19:50    


 


Sodium  142 mmol/L (136-145)   02/02/20  05:54    


 


Potassium  4.2 mmol/L (3.5-5.1)   02/02/20  05:54    


 


BUN  25 mg/dL (7-18)  H  02/02/20  05:54    


 


Creatinine  1.42 mg/dL (0.55-1.3)  H  02/02/20  05:54    


 


Glucose  100 mg/dL ()   02/02/20  05:54    


 


Magnesium  2.0 mg/dL (1.8-2.4)   02/01/20  15:40    


 


Total Bilirubin  0.8 mg/dL (0.2-1.0)   02/02/20  05:54    


 


AST  16 U/L (15-37)   02/02/20  05:54    


 


ALT  17 U/L (12-78)   02/02/20  05:54    


 


Alkaline Phosphatase  60 U/L ()   02/02/20  05:54    








Home Medications: 








Acetaminophen 500 mg PO Q6HP PRN 02/01/20 


Rifampin [Rifadin] 300 mg PO BID 02/01/20 


Warfarin Sodium [Coumadin*] 5 mg PO DAILY 02/01/20 


Cefdinir [Omnicef] 300 mg PO BID #14 capsule 02/02/20 





New Medications: 


Cefdinir [Omnicef] 300 mg PO BID #14 capsule


Patient Discharge Instructions: -Apply ACEI wrap to legs daily.  - can remove 

wrap at night time.  - discuss with your PCP for a referal to a DME for 

customized compression stockings


Diet: Low sodium


Activity: Ad jimmie


Physician Review: Patient Assessed, Agree with Above Assessment and Plan


Time spent managing pt's care (in minutes): 35

## 2020-02-02 NOTE — HP
Date of Admission:  02/01/2020



Presenting Complaint:  Right lower extremity redness.



History Of Present Illness:  Anthony Simental 74-year-old  male with history of CVA in the past 
with mild right hemiparesis, chronic lower extremity lymphedema with recurrent cellulitis in the past
, typically treated with oral antibiotics.  The patient presented today because of worsening leg swel
ling with redness of the right lower leg.  He states he was seen by his primary physician, Dr. Mayuri peacock
nd was started on antibiotics 3 days ago.  The patient developed a rash.  He is unable to tell the na
me of the antibiotics.  His antibiotics were later switched to Bactrim and rifampin yesterday.  The p
atkasia only took 2 doses this morning and then came to the emergency room.  He denies any fever or ch
ills.  He states he has a history of chronic lower extremity eczema for which he was treated with henrietta
roid many years ago.  He denies any shortness of breath.  He does not wear any compression stockings 
at home.



Past Medical History:  Significant for hypertension, history of CVA, history of chronic anticoagulati
on due to CVA.



Past Surgical History:  None.



Family History:  Patient admits to history of high blood pressure, half brother with history of blood
 clots.



Social History:  Patient is a lifelong nonsmoker.  No history of alcohol or illicit drug use.  He amb
ulates with the aid of a cane at baseline.  He denies any illicit drug use.  He resides in the [facility] with his spouse.



Home Medications:  The patient is unsure of full list.  We will follow the list after nurses verify i
t.



Review of Systems:

All systems reviewed x14 were negative except as mentioned above.



Allergies:  NO KNOWN DRUG ALLERGIES.



Physical Examination:

Vital Signs:  On presentation in the ED, blood pressure 139/68, pulse of 84, respiratory rate of 16, 
temp 97.5, O2 saturation 97 on room air.  Weight of 140 kg. 

General:  Obese, elderly male, not in any distress, on nasal cannula O2 on room air. 

HEENT:  Head is atraumatic, normocephalic.  Pupils equal, reactive to light. 

Neck:  No JVD.  No carotid bruit. 

Respiratory:  Good air entry.  No crepitation. 

Cardiovascular:  S1, S2.  Rate and rhythm regular. 

GI:  Abdomen is obese, but soft.  Bowel sounds positive.  No organomegaly. 

Rectal: Deferred. 

Musculoskeletal:  Extremities with 2 to 3+ lower extremity edema, more prominent over the right lower
 leg.  Multiple mild erythema over small papules on the right leg extending beneath knee region.  No 
significant calf tenderness on movement of the leg.  Left lower extremity also have similar, but abse
nt erythema over the chronic leg swelling.  No area of open ulceration noted.  Areas of excoriation o
f the skin around the flexure of the ankle on the right is noted. 

Neuro:  Patient is alert, oriented.  Cranial nerves 2 through 12 grossly intact.



Laboratory Data:  Chest x-ray shows mild linear subsegmental atelectasis, left lung base.  Ultrasound
 of the lower extremity shows no evidence of DVT.  WBC 6.3, hemoglobin 13, platelets 204.  INR 2.7.  
PT 31.  Sodium 139, potassium 4.1, creatinine 1.6.  No previous to compare.  Magnesium 2.0.  ProBNP o
f 210.  Procalcitonin of less than 0.05.



Impression:  

1.Chronic lower extremity edema likely due to venous stasis.

2.Mild right lower extremity cellulitis.

3.Hypertension.

4.History of cerebrovascular accident.



Plan:  

1.We will admit patient to observation.  We will manage patient with IV vancomycin and restart patie
nt on trial of p.o. clindamycin.  We will obtain blood culture x2; although, we follow area of russ alexis in a.m. if improving.  The patient can be safely discharged on clindamycin and rifampin as patient
 did not really get full doses of rifampin and Bactrim.  We will monitor repeat CBC in a.m.  Given ch
ronic lower extremity edema, need for compression stockings discussed with patient.  We will start pa
leeroy on MISAEL hose while inpatient.  DVT prophylaxis.  Patient on Coumadin.  We will resume Coumadin. 
 INR therapeutic.

2.Advanced directives:  The patient is a full code. 

Total time spent on review of record, discussion with patient, and evaluation, greater than 55 minute
s.





EO/MODL

DD:  02/01/2020 17:24:15Voice ID:  [id number]

## 2020-02-03 NOTE — EKG
Test Date:    2020-02-01               Test Time:    16:01:28

Technician:   TAISHA                                     

                                                     

MEASUREMENT RESULTS:                                       

Intervals:                                           

Rate:         69                                     

CT:           158                                    

QRSD:         76                                     

QT:           302                                    

QTc:          323                                    

Axis:                                                

P:            24                                     

CT:           158                                    

QRS:          5                                      

T:            38                                     

                                                     

INTERPRETIVE STATEMENTS:                                       

                                                     

Normal sinus rhythm

Nonspecific T wave abnormality

Abnormal ECG

Compared to ECG 10/22/2019 16:51:21

T-wave abnormality now present



Electronically Signed On 02-03-20 05:28:49 CST by Suraj Thibodeaux

## 2020-10-27 ENCOUNTER — HOSPITAL ENCOUNTER (INPATIENT)
Dept: HOSPITAL 97 - ER | Age: 75
LOS: 2 days | Discharge: HOME | DRG: 603 | End: 2020-10-29
Attending: FAMILY MEDICINE | Admitting: FAMILY MEDICINE
Payer: COMMERCIAL

## 2020-10-27 VITALS — BODY MASS INDEX: 39.7 KG/M2

## 2020-10-27 DIAGNOSIS — Z86.73: ICD-10-CM

## 2020-10-27 DIAGNOSIS — N17.9: ICD-10-CM

## 2020-10-27 DIAGNOSIS — Z88.1: ICD-10-CM

## 2020-10-27 DIAGNOSIS — L02.416: ICD-10-CM

## 2020-10-27 DIAGNOSIS — R64: ICD-10-CM

## 2020-10-27 DIAGNOSIS — E83.51: ICD-10-CM

## 2020-10-27 DIAGNOSIS — Z79.01: ICD-10-CM

## 2020-10-27 DIAGNOSIS — Z79.899: ICD-10-CM

## 2020-10-27 DIAGNOSIS — N18.30: ICD-10-CM

## 2020-10-27 DIAGNOSIS — I12.9: ICD-10-CM

## 2020-10-27 DIAGNOSIS — Z20.828: ICD-10-CM

## 2020-10-27 DIAGNOSIS — E03.9: ICD-10-CM

## 2020-10-27 DIAGNOSIS — E66.01: ICD-10-CM

## 2020-10-27 DIAGNOSIS — L03.116: Primary | ICD-10-CM

## 2020-10-27 LAB
ALBUMIN SERPL BCP-MCNC: 3.5 G/DL (ref 3.4–5)
ALP SERPL-CCNC: 74 U/L (ref 45–117)
ALT SERPL W P-5'-P-CCNC: 19 U/L (ref 12–78)
AST SERPL W P-5'-P-CCNC: 16 U/L (ref 15–37)
BLD SMEAR INTERP: (no result)
BUN BLD-MCNC: 31 MG/DL (ref 7–18)
GLUCOSE SERPLBLD-MCNC: 152 MG/DL (ref 74–106)
HCT VFR BLD CALC: 42.8 % (ref 39.6–49)
LIPASE SERPL-CCNC: 139 U/L (ref 73–393)
LYMPHOCYTES # SPEC AUTO: 0.9 K/UL (ref 0.7–4.9)
MORPHOLOGY BLD-IMP: (no result)
PMV BLD: 7.4 FL (ref 7.6–11.3)
POTASSIUM SERPL-SCNC: 3.9 MMOL/L (ref 3.5–5.1)
RBC # BLD: 4.98 M/UL (ref 4.33–5.43)
TOXIC GRANULES BLD QL SMEAR: (no result)
TSH SERPL DL<=0.05 MIU/L-ACNC: 7.04 UIU/ML (ref 0.36–3.74)

## 2020-10-27 PROCEDURE — 84550 ASSAY OF BLOOD/URIC ACID: CPT

## 2020-10-27 PROCEDURE — 85610 PROTHROMBIN TIME: CPT

## 2020-10-27 PROCEDURE — 83690 ASSAY OF LIPASE: CPT

## 2020-10-27 PROCEDURE — 84439 ASSAY OF FREE THYROXINE: CPT

## 2020-10-27 PROCEDURE — 84443 ASSAY THYROID STIM HORMONE: CPT

## 2020-10-27 PROCEDURE — 96367 TX/PROPH/DG ADDL SEQ IV INF: CPT

## 2020-10-27 PROCEDURE — 71045 X-RAY EXAM CHEST 1 VIEW: CPT

## 2020-10-27 PROCEDURE — 83935 ASSAY OF URINE OSMOLALITY: CPT

## 2020-10-27 PROCEDURE — 81003 URINALYSIS AUTO W/O SCOPE: CPT

## 2020-10-27 PROCEDURE — 87040 BLOOD CULTURE FOR BACTERIA: CPT

## 2020-10-27 PROCEDURE — 97161 PT EVAL LOW COMPLEX 20 MIN: CPT

## 2020-10-27 PROCEDURE — 84145 PROCALCITONIN (PCT): CPT

## 2020-10-27 PROCEDURE — 99285 EMERGENCY DEPT VISIT HI MDM: CPT

## 2020-10-27 PROCEDURE — 76770 US EXAM ABDO BACK WALL COMP: CPT

## 2020-10-27 PROCEDURE — 84132 ASSAY OF SERUM POTASSIUM: CPT

## 2020-10-27 PROCEDURE — 96375 TX/PRO/DX INJ NEW DRUG ADDON: CPT

## 2020-10-27 PROCEDURE — 93971 EXTREMITY STUDY: CPT

## 2020-10-27 PROCEDURE — 80202 ASSAY OF VANCOMYCIN: CPT

## 2020-10-27 PROCEDURE — 97116 GAIT TRAINING THERAPY: CPT

## 2020-10-27 PROCEDURE — 83605 ASSAY OF LACTIC ACID: CPT

## 2020-10-27 PROCEDURE — 87086 URINE CULTURE/COLONY COUNT: CPT

## 2020-10-27 PROCEDURE — 36415 COLL VENOUS BLD VENIPUNCTURE: CPT

## 2020-10-27 PROCEDURE — 84300 ASSAY OF URINE SODIUM: CPT

## 2020-10-27 PROCEDURE — 96365 THER/PROPH/DIAG IV INF INIT: CPT

## 2020-10-27 PROCEDURE — 82550 ASSAY OF CK (CPK): CPT

## 2020-10-27 PROCEDURE — 87088 URINE BACTERIA CULTURE: CPT

## 2020-10-27 PROCEDURE — 85730 THROMBOPLASTIN TIME PARTIAL: CPT

## 2020-10-27 PROCEDURE — 83735 ASSAY OF MAGNESIUM: CPT

## 2020-10-27 PROCEDURE — 80048 BASIC METABOLIC PNL TOTAL CA: CPT

## 2020-10-27 PROCEDURE — 96366 THER/PROPH/DIAG IV INF ADDON: CPT

## 2020-10-27 PROCEDURE — 81015 MICROSCOPIC EXAM OF URINE: CPT

## 2020-10-27 PROCEDURE — 85025 COMPLETE CBC W/AUTO DIFF WBC: CPT

## 2020-10-27 PROCEDURE — 80076 HEPATIC FUNCTION PANEL: CPT

## 2020-10-27 RX ADMIN — CEFEPIME SCH: 1 INJECTION, POWDER, FOR SOLUTION INTRAMUSCULAR; INTRAVENOUS at 23:00

## 2020-10-27 RX ADMIN — Medication SCH ML: at 23:32

## 2020-10-27 NOTE — P.HP
Certification for Inpatient


Patient admitted to: Inpatient


With expected LOS: >2 Midnights


Patient will require the following post-hospital care: None


Practitioner: I am a practitioner with admitting privileges, knowledge of 

patient current condition, hospital course, and medical plan of care.


Services: Services provided to patient in accordance with Admission requirements

found in Title 42 Section 412.3 of the Code of Federal Regulations





<YosiayushJett - Last Filed: 10/27/20 20:15>





Patient History


Date of Service: 10/27/20


Primary Care Provider: Dr. Messina


Reason for admission: Cellulitis left lower extremity


History of Present Illness: 





74-year-old  male with history of hypothyroidism and CVA approximately 

5 years ago on chronic anticoagulation therapy with warfarin presents emergency 

department for rash/cellulitis of left lower extremity.  Patient reports that he

has had redness of left lower extremity with abscess for the last approximately 

2 weeks, patient reportedly and has been taking rifampin that was prescribed by 

his PCP for the cellulitis left lower extremity.  Patient was evaluated in the 

emergency department found to have an elevated white blood cell count at 18,000,

small abscess to the left lateral calf area draining small amount of 

serosanguineous/purulent drainage.  Patient reports his history of eczema and 

frequently has small abscesses pop up.  During his evaluation in the emergency 

department also noted that his creatinine was elevated at 2.0, GFR 33, BUN 31.  

Patient's baseline GFR at this time appears to be around 45. ED provider wishes 

to admit patient for further evaluation and management.





When I saw the patient in the emergency department is awake, alert, oriented x3.

 Patient reports mild pain to the left lower extremity.  Pro calcitonin and 

lactate pending at this time, blood cultures obtained.  Patient does not appear 

septic at this time.  Patient be admitted for further evaluation and management.







- Past Medical/Surgical History


Diabetic: No


-: CEREBRAL ATHEROSLEROSIS


-: HX OF CVA


-: ALCOHOL ABUSE


-: PEG TUBE PLACEMENT


Psychosocial/ Personal History: Patient lives at home with his wife and is 

retired.





- Family History


  ** Father


-: GI disease


Notes: STOMACH ULCERS





  ** Mother


Notes: ALZHEIMERS





- Social History


Smoking Status: Never smoker


Alcohol use: Yes


CD- Drugs: No


Caffeine use: Yes


Place of Residence: Home





<Jett Smith - Last Filed: 10/27/20 20:15>


Date of Service: 10/28/20


Home medications list reviewed: Yes





<Man Biswas - Last Filed: 10/28/20 12:54>


Allergies





Sulfa (Sulfonamide Antibiotics) Allergy (Verified 10/28/20 04:52)


   Itching/Hives/Rash





Home Medications: 








Acetaminophen 500 mg PO Q6HP PRN 02/01/20 


Rifampin [Rifadin] 300 mg PO BID 02/01/20 


Warfarin Sodium [Coumadin*] 5 mg PO DAILY 02/01/20 


Doxycycline Hyclate 100 mg PO BID 10/28/20 








Review of Systems


10-point ROS is otherwise unremarkable


Integumentary: Other (Cellulitis left lower extremity, abscess.), As per HPI





<Jett Smith - Last Filed: 10/27/20 20:15>





Physical Examination





- Physical Exam


General: Alert, In no apparent distress, Oriented x3


HEENT: Atraumatic, Normocephalic, PERRLA, Mucous membr. moist/pink


Neck: Supple


Respiratory: Clear to auscultation bilaterally, Normal air movement


Cardiovascular: Normal pulses, Regular rate/rhythm, Normal S1 S2


Capillary refill: <2 Seconds


Gastrointestinal: Normal bowel sounds, Soft and benign


Musculoskeletal: Erythema, Tenderness, Warmth (Left lower extremity)


Integumentary: Skin lesion (Small abscess left lateral aspect of the calf), 

Tenderness/swelling, Erythema, Warmth (Left lower extremity)


Neurological: Normal speech, Normal strength at 5/5 x4 extr, Normal tone, 

Sensation intact





- Studies


Laboratory Data (last 24 hrs)





10/27/20 18:00: WBC 18.2 H, Hgb 14.3, Hct 42.8, Plt Count 348


10/27/20 18:00: Sodium 141, Potassium 3.9, BUN 31 H, Creatinine 2.00 H, Glucose 

152 H, Total Bilirubin 1.5 H, AST 16, ALT 19, Alkaline Phosphatase 74, Lipase 

139








<Jett Smith - Last Filed: 10/27/20 20:15>





- Studies


Laboratory Data (last 24 hrs)





10/27/20 18:00: WBC 18.2 H, Hgb 14.3, Hct 42.8, Plt Count 348


10/27/20 18:00: Sodium 141, Potassium 3.9, BUN 31 H, Creatinine 2.00 H, Glucose 

152 H, Total Bilirubin 1.5 H, AST 16, ALT 19, Alkaline Phosphatase 74, Lipase 

139








<Man Biswas - Last Filed: 10/28/20 12:54>





Assessment and Plan





- Plan


Assessment


Cellulitis left lower extremity complicated with abscess to the left lateral 

calf area


ERNST on CKD 3


Hypothyroidism


History of CVA on chronic anticoagulation therapy with Coumadin





Plan


Cellulitis left lower extremity complicated with abscess to the left lateral 

calf area:  Antibiotic therapy with cefepime/vancomycin.  Surgical consult in 

place due to presence of abscess.  NPO after midnight.  Will obtain ultrasound 

left lower extremity to rule out DVT and evaluate abscess of the left lower 

extremity.  DVT prophylaxis with patient is Coumadin 5 mg p.o. daily.


ERNST on CKD 3:  Continue with gentle hydration, obtain renal ultrasound, urine 

electrolytes.  Nephrology consult in place as patient says he has not seen 

Nephrology in the past.


Hypothyroidism:  Patient just started taking levothyroxine 100 mcg.  Continue 

medication.


History of CVA on chronic anticoagulation therapy with Coumadin:  Continue 

Coumadin at this time unless surgical intervention is necessary.  Will obtain 

coags with morning labs.


Discharge Plan: Home


Plan to discharge in: 48 Hours





- Advance Directives


Does patient have a Living Will: No


Does patient have a Durable POA for Healthcare: Yes





- Code Status/Comfort Care


Code Status Assessed: Yes (Full Code)


Critical Care: No


Time Spent Managing Pts Care (In Minutes): 55





<Jett Smith - Last Filed: 10/27/20 20:15>





- Plan


Case discussed in detail with nurse practitioner.  Agree with evaluation and 

plan of care.  Please see progress note for details.  Surgery to evaluate 

patient today.





<Man Biswas - Last Filed: 10/28/20 12:54>

## 2020-10-27 NOTE — EDPHYS
Physician Documentation                                                                           

 [facility]                                                                 

Name: Anthony Simental                                                                                

Age: 74 yrs                                                                                       

Sex: Male                                                                                         

: 1945                                                                                   

MRN: [id number]                                                                                   

Arrival Date: 10/27/2020                                                                          

Time: 15:23                                                                                       

Account#: [id number]                                                                            

Bed 16                                                                                            

Private MD: Ketan Messina T                                                                        

ED Physician Homar Olson                                                                      

HPI:                                                                                              

10/27                                                                                             

18:57 This 74 yrs old  Male presents to ER via Wheelchair with complaints of         kb  

      Allergic Reaction.                                                                          

18:57 The patient presents with redness of skin. Onset: The symptoms/episode began/occurred   kb  

      today. Associated signs and symptoms: Pertinent positives: rash. Possible causes: At        

      home the patient or guardian has treated the symptoms with nothing. Severity of             

      symptoms: At their worst the symptoms were moderate in the emergency department the         

      symptoms are unchanged. The patient has not experienced similar symptoms in the past.       

      The patient has been recently seen by a physician:. Pt reports he was prescribed 2          

      antibiotics that he has had before for cellulitis and levothyroxine that is a new           

      medication. States he took his first dose of all of those at 1100 today and since then      

      has had itching and shaking that he hasn't been able to control. .                          

                                                                                                  

Historical:                                                                                       

- Allergies:                                                                                      

16:25 Sulfa (Sulfonamide Antibiotics);                                                        iw  

- PMHx:                                                                                           

16:25 CVA;                                                                                    iw  

                                                                                                  

- Immunization history:: Adult Immunizations up to date.                                          

- Social history:: Smoking status: Patient denies any tobacco usage or history of.                

                                                                                                  

                                                                                                  

ROS:                                                                                              

18:55 Cardiovascular: Negative for chest pain, palpitations, and edema, Respiratory: Negative kb  

      for shortness of breath, cough, wheezing, and pleuritic chest pain, Abdomen/GI:             

      Negative for abdominal pain, nausea, vomiting, diarrhea, and constipation,                  

      MS/Extremity: Negative for injury and deformity, Neuro: Negative for headache,              

      weakness, numbness, tingling, and seizure.                                                  

18:55 Constitutional: Positive for chills, malaise.                                               

18:55 Skin: Positive for abscess, cellulitis, of the left shin.                                   

                                                                                                  

Exam:                                                                                             

18:55 Constitutional:  This is a well developed, well nourished patient who is awake, alert,  kb  

      and in no acute distress. Head/Face:  Normocephalic, atraumatic. Chest/axilla:  Normal      

      chest wall appearance and motion.  Nontender with no deformity.  No lesions are             

      appreciated. Cardiovascular:  Regular rate and rhythm with a normal S1 and S2.  No          

      gallops, murmurs, or rubs.  Normal PMI, no JVD.  No pulse deficits. Respiratory:  Lungs     

      have equal breath sounds bilaterally, clear to auscultation and percussion.  No rales,      

      rhonchi or wheezes noted.  No increased work of breathing, no retractions or nasal          

      flaring. Abdomen/GI:  Soft, non-tender, with normal bowel sounds.  No distension or         

      tympany.  No guarding or rebound.  No evidence of tenderness throughout. MS/ Extremity:     

       Pulses equal, no cyanosis.  Neurovascular intact.  Full, normal range of motion.           

      Neuro:  Awake and alert, GCS 15, oriented to person, place, time, and situation.            

      Cranial nerves II-XII grossly intact.  Motor strength 5/5 in all extremities.  Sensory      

      grossly intact.  Cerebellar exam normal.  Normal gait.                                      

18:55 Skin: abscess, that is small, of the lateral aspect of left calf, with drainage, with       

      surrounding cellulitis, cellulitis, that is moderate, on the  left shin.                    

                                                                                                  

Vital Signs:                                                                                      

16:32  / 77; Pulse 105; Resp 19 S; Temp 97.8(TE); Pulse Ox 94% on R/A; Weight 132.9 kg  ca1 

      (R); Height 6 ft. 0 in. (182.88 cm) (R); Pain 0/10;                                         

18:20  / 87; Pulse 106; Resp 17; Pulse Ox 95% ;                                         bp  

19:10 BP 99 / 62; Pulse 75; Resp 19; Temp 98; Pulse Ox 98% ;                                  rr5 

20:20 BP 92 / 70; Pulse 80; Resp 21; Pulse Ox 98% ;                                           rr5 

21:21  / 74; Pulse 86; Resp 19; Temp 98; Pulse Ox 97% ;                                 rr5 

22:10  / 70; Pulse 80; Resp 19; Pulse Ox 99% ;                                          rr5 

16:32 Body Mass Index 39.74 (132.90 kg, 182.88 cm)                                            ca1 

                                                                                                  

MDM:                                                                                              

17:21 Patient medically screened.                                                             kb  

18:06 Data reviewed: vital signs, nurses notes. Data interpreted: Pulse oximetry: on room air kb  

      is 94 %. Interpretation: acceptable.                                                        

18:55 Counseling: I had a detailed discussion with the patient and/or guardian regarding: the kb  

      historical points, exam findings, and any diagnostic results supporting the                 

      discharge/admit diagnosis, lab results, radiology results, the need for further work-up     

      and treatment in the hospital. Physician consultation: Jett BERMUDEZ was contacted       

      at 18:55, regarding admission, to the telemetry unit. patient's condition, and will see     

      patient in ED, shortly.                                                                     

                                                                                                  

10/27                                                                                             

17:29 Order name: Basic Metabolic Panel; Complete Time: 19:14                                 kb  

10/27                                                                                             

17:29 Order name: CBC with Diff; Complete Time: 18:46                                         kb  

10/27                                                                                             

17:29 Order name: Hepatic Function; Complete Time: 19:14                                      kb  

10/27                                                                                             

17:29 Order name: Lipase; Complete Time: 19:14                                                kb  

10/27                                                                                             

17:29 Order name: TSH; Complete Time: 19:14                                                   kb  

10/27                                                                                             

18:40 Order name: CBC Smear Scan; Complete Time: 18:46                                        EDMS

10/27                                                                                             

18:48 Order name: T4 Free; Complete Time: 19:14                                               EDMS

10/27                                                                                             

18:49 Order name: Blood Culture Adult (2)                                                     kb  

10/27                                                                                             

18:49 Order name: Lactate; Complete Time: 20:32                                               kb  

10/27                                                                                             

18:49 Order name: Procalcitonin; Complete Time: 21:13                                         kb  

10/27                                                                                             

19:57 Order name: COVID-19                                                                    rr5 

10/27                                                                                             

20:07 Order name: CORONAVIRUS                                                                 EDMS

10/27                                                                                             

21:01 Order name: SARS-COV-2 RT PCR; Complete Time: 21:13                                     EDMS

10/27                                                                                             

17:29 Order name: IV Saline Lock; Complete Time: 18:06                                        kb  

10/27                                                                                             

17:29 Order name: Labs collected and sent; Complete Time: 18:06                               kb  

                                                                                                  

Administered Medications:                                                                         

17:45 Drug: Benadryl 12.5 mg Route: IVP; Site: left antecubital;                              bp  

18:54 Follow up: Response: No adverse reaction                                                bp  

18:00 Drug: Pepcid 20 mg Route: IVP; Site: left antecubital;                                  bp  

18:54 Follow up: Response: No adverse reaction                                                bp  

19:46 Drug: NS 0.9% 1000 ml Route: IV; Rate: 1000 ml; Site: left antecubital;                 rr5 

21:30 Follow up: Response: No adverse reaction; IV Status: Completed infusion; IV Intake:     rr5 

      1000ml                                                                                      

19:47 Dru grams of (Cefepime 1 grams, NS 0.9% 100 ml) Route: IVPB; Rate: 200 ml/hr;       rr5 

      Infused Over: 30 mins; Site: left antecubital;                                              

20:18 Follow up: Response: No adverse reaction; IV Status: Completed infusion; IV Intake:     rr5 

      100ml                                                                                       

20:18 Dru grams of (vancoMYCIN 1 grams, NS 0.9% 250 ml) Route: IVPB; Infused Over: 2 hrs; rr5 

      Site: left antecubital;                                                                     

22:31 Follow up: Response: No adverse reaction; IV Status: Infusion continued upon admission; rr5 

      IV Intake: 210ml                                                                            

21:00 Drug: NS 0.9% 1000 ml Route: IV; Rate: 1 bolus; Site: left antecubital;                 rr5 

22:32 Follow up: Response: No adverse reaction; IV Status: Infusion continued upon admission; rr5 

      IV Intake: 400ml                                                                            

                                                                                                  

                                                                                                  

Disposition:                                                                                      

10/28                                                                                             

11:59 Co-signature as Attending Physician, Homar Olson MD I agree with the assessment and  cindi 

      plan of care.                                                                               

                                                                                                  

Disposition:                                                                                      

10/27/20 19:00 Hospitalization ordered by Man Biswas for Observation. Preliminary             

  diagnosis are Cellulitis of left lower limb, Elevated white blood cell count,                   

  Cutaneous abscess of left lower limb.                                                           

- Bed requested for Telemetry/MedSurg (observation).                                              

- Status is Observation.                                                                      rr5 

- Condition is Stable.                                                                            

- Problem is new.                                                                                 

- Symptoms are unchanged.                                                                         

                                                                                                  

                                                                                                  

                                                                                                  

Signatures:                                                                                       

Dispatcher [employer]                           EDBritta Goodson, FNP-C                 [facility]-Homar Brewer MD MD cha Williams, Irene, Jett Hill RN, FNP-C                      [facility]-ClaZelda Guerrero RN                      RN   tl1                                                  

Maxim Desai, Anirudh Uribe RN, RN                      RN   rr5                                                  

                                                                                                  

Corrections: (The following items were deleted from the chart)                                    

10/27                                                                                             

19:00 19:00 Hospitalization Ordered by Man Biswas DO for Observation. Preliminary          kb  

      diagnosis is Cellulitis of left lower limb. Bed requested for Telemetry/MedSurg             

      (observation). Status is Observation. Condition is Stable. Problem is new. Symptoms are     

      unchanged. kb                                                                               

21:14 19:00 10/27/2020 19:00 Hospitalization Ordered by Man Biswas DO for Observation.     tl1 

      Preliminary diagnosis is Cellulitis of left lower limb; Elevated white blood cell           

      count; Cutaneous abscess of left lower limb. Bed requested for Telemetry/MedSurg            

      (observation). Status is Observation. Condition is Stable. Problem is new. Symptoms are     

      unchanged. kb                                                                               

22:32 21:14 10/27/2020 19:00 Hospitalization Ordered by Man Biswas DO for Observation.     rr5 

      Preliminary diagnosis is Cellulitis of left lower limb; Elevated white blood cell           

      count; Cutaneous abscess of left lower limb. Bed requested for Telemetry/MedSurg            

      (observation). Status is Observation. Condition is Stable. Problem is new. Symptoms are     

      unchanged. tl1                                                                              

                                                                                                  

**************************************************************************************************

## 2020-10-27 NOTE — ER
Nurse's Notes                                                                                     

 [facility]                                                                 

Name: Anthony Simental                                                                                

Age: 74 yrs                                                                                       

Sex: Male                                                                                         

: 1945                                                                                   

MRN: [id number]                                                                                   

Arrival Date: 10/27/2020                                                                          

Time: 15:23                                                                                       

Account#: [id number]                                                                            

Bed 16                                                                                            

Private MD: Ketan Messina T                                                                        

Diagnosis: Cellulitis of left lower limb;Elevated white blood cell count;Cutaneous abscess of left

  lower limb                                                                                      

                                                                                                  

Presentation:                                                                                     

10/27                                                                                             

16:21 Chief complaint: Patient states: has hx of eczema and cellulitis, was prescribed new    iw  

      medication by his doctor (doxycycline, levothyroxine, rifampin) took new meds today at      

      11:30, started having itching all over and diarrhea and also had cold chills and            

      shakes. no difficulty breathing. Coronavirus screen:. Ebola Screen: Patient negative        

      for fever greater than or equal to 101.5 degrees Fahrenheit, and additional compatible      

      Ebola Virus Disease symptoms Patient denies exposure to infectious person. Patient          

      denies travel to an Ebola-affected area in the 21 days before illness onset. No             

      symptoms or risks identified at this time. Anaphylaxis evaluation, no signs or symptoms     

      of anaphylaxis were noted. Initial Sepsis Screen: Does the patient meet any 2 criteria?     

      No. Patient's initial sepsis screen is negative. Does the patient have a suspected          

      source of infection? No. Patient's initial sepsis screen is negative. Risk Assessment:      

      Do you want to hurt yourself or someone else? Patient reports no desire to harm self or     

      others. Onset of symptoms was 2020.                                             

16:21 Method Of Arrival: Wheelchair                                                           iw  

16:21 Acuity: JUAN C 3                                                                           iw  

16:30 Onset: The symptoms/episode began/occurred acutely, this morning.                       bp  

                                                                                                  

Triage Assessment:                                                                                

17:30 General: Appears distressed, uncomfortable, obese, Behavior is cooperative, appropriate bp  

      for age, agitated, anxious. Pain: Denies pain. EENT: No deficits noted. Neuro: Level of     

      Consciousness is awake, alert, obeys commands, Oriented to person, place, time,             

      situation, Appropriate for age. Cardiovascular: Rhythm is sinus tachycardia.                

      Respiratory: Airway is patent Respiratory effort is even, unlabored, Respiratory            

      pattern is regular, symmetrical. GI: No signs and/or symptoms were reported involving       

      the gastrointestinal system. : No signs and/or symptoms were reported regarding the       

      genitourinary system. Derm: Rash noted that is red, urticaria. Musculoskeletal: No          

      deficits noted.                                                                             

                                                                                                  

Historical:                                                                                       

- Allergies:                                                                                      

16:25 Sulfa (Sulfonamide Antibiotics);                                                        iw  

- PMHx:                                                                                           

16:25 CVA;                                                                                    iw  

                                                                                                  

- Immunization history:: Adult Immunizations up to date.                                          

- Social history:: Smoking status: Patient denies any tobacco usage or history of.                

                                                                                                  

                                                                                                  

Screenin:30 Abuse screen: Denies threats or abuse. Denies injuries from another. Nutritional        bp  

      screening: No deficits noted. Tuberculosis screening: No symptoms or risk factors           

      identified. Fall Risk None identified.                                                      

                                                                                                  

Assessment:                                                                                       

17:30 General: SEE TRIAGE NOTE.                                                               bp  

19:00 General: Appears in no apparent distress. comfortable, Behavior is calm, cooperative,   rr5 

      appropriate for age.                                                                        

19:00 Pain: Denies pain. Neuro: Level of Consciousness is awake, alert, obeys commands,       rr5 

      Oriented to person, place, time, situation. Cardiovascular: Capillary refill < 3            

      seconds Patient's skin is warm and dry. Respiratory: Airway is patent Respiratory           

      effort is even, unlabored, Respiratory pattern is regular, symmetrical, GI: Abdomen is      

      round umbilical hernia noted Reports diarrhea, nausea. : No signs and/or symptoms         

      were reported regarding the genitourinary system. EENT: No signs and/or symptoms were       

      reported regarding the EENT system. Derm: Skin has blisters on left lower leg Skin          

      temperature is warm cellulitis left lower leg warm to touch. Musculoskeletal: Capillary     

      refill < 3 seconds.                                                                         

19:40 Reassessment: Patient appears in no apparent distress at this time. Patient is alert,   rr5 

      oriented x 3, equal unlabored respirations, skin warm/dry/pink. for admission               

      hospitalist at bedside.                                                                     

20:15 Reassessment: Patient appears in no apparent distress at this time. Patient and/or      rr5 

      family updated on plan of care and expected duration. Pain level reassessed.                

21:00 Reassessment: Patient appears in no apparent distress at this time. Patient is alert,   rr5 

      oriented x 3, equal unlabored respirations, skin warm/dry/pink. no complaints made,         

      awaiting for covid result.                                                                  

22:20 Reassessment: Patient appears in no apparent distress at this time. Patient is alert,   rr5 

      oriented x 3, equal unlabored respirations, skin warm/dry/pink. transfer to room 221        

      wake alert, vital signs hemodynamically stable.                                             

                                                                                                  

Vital Signs:                                                                                      

16:32  / 77; Pulse 105; Resp 19 S; Temp 97.8(TE); Pulse Ox 94% on R/A; Weight 132.9 kg  ca1 

      (R); Height 6 ft. 0 in. (182.88 cm) (R); Pain 0/10;                                         

18:20  / 87; Pulse 106; Resp 17; Pulse Ox 95% ;                                         bp  

19:10 BP 99 / 62; Pulse 75; Resp 19; Temp 98; Pulse Ox 98% ;                                  rr5 

20:20 BP 92 / 70; Pulse 80; Resp 21; Pulse Ox 98% ;                                           rr5 

21:21  / 74; Pulse 86; Resp 19; Temp 98; Pulse Ox 97% ;                                 rr5 

22:10  / 70; Pulse 80; Resp 19; Pulse Ox 99% ;                                          rr5 

16:32 Body Mass Index 39.74 (132.90 kg, 182.88 cm)                                            ca1 

                                                                                                  

ED Course:                                                                                        

15:23 Patient arrived in ED.                                                                  mr  

15:23 Ketan Messina MD is Private Physician.                                                   mr  

15:25 Britta Otoole FNP-C is PHCP.                                                        kb  

15:25 Homar Olson MD is Attending Physician.                                             kb  

16:23 Triage completed.                                                                       iw  

16:23 Arm band placed on.                                                                     iw  

17:21 Britta Otoole FNP-C is PHCP.                                                        kb  

17:21 Homar Olson MD is Attending Physician.                                             kb  

17:30 Patient has correct armband on for positive identification. Bed in low position. Call   bp  

      light in reach. Side rails up X2.                                                           

17:41 Maxim Desai, RN is Primary Nurse.                                                    bp  

18:00 Inserted saline lock: 22 gauge in left antecubital area, using aseptic technique. Blood bp  

      collected.                                                                                  

19:00 Man Biswas DO is Hospitalizing Provider.                                           kb  

19:40 Inserted saline lock: 20 gauge in left hand, using aseptic technique. Blood collected.  rr5 

19:40 First set of blood cultures drawn by me.                                                rr5 

20:00 Second set of blood cultures drawn by me.                                               rr5 

20:20 covid.                                                                                  rr5 

22:30 No provider procedures requiring assistance completed. Patient admitted, IV remains in  rr5 

      place. intact, No redness/swelling at site.                                                 

                                                                                                  

Administered Medications:                                                                         

17:45 Drug: Benadryl 12.5 mg Route: IVP; Site: left antecubital;                              bp  

18:54 Follow up: Response: No adverse reaction                                                bp  

18:00 Drug: Pepcid 20 mg Route: IVP; Site: left antecubital;                                  bp  

18:54 Follow up: Response: No adverse reaction                                                bp  

19:46 Drug: NS 0.9% 1000 ml Route: IV; Rate: 1000 ml; Site: left antecubital;                 rr5 

21:30 Follow up: Response: No adverse reaction; IV Status: Completed infusion; IV Intake:     rr5 

      1000ml                                                                                      

19:47 Dru grams of (Cefepime 1 grams, NS 0.9% 100 ml) Route: IVPB; Rate: 200 ml/hr;       rr5 

      Infused Over: 30 mins; Site: left antecubital;                                              

20:18 Follow up: Response: No adverse reaction; IV Status: Completed infusion; IV Intake:     rr5 

      100ml                                                                                       

20:18 Dru grams of (vancoMYCIN 1 grams, NS 0.9% 250 ml) Route: IVPB; Infused Over: 2 hrs; rr5 

      Site: left antecubital;                                                                     

22:31 Follow up: Response: No adverse reaction; IV Status: Infusion continued upon admission; rr5 

      IV Intake: 210ml                                                                            

21:00 Drug: NS 0.9% 1000 ml Route: IV; Rate: 1 bolus; Site: left antecubital;                 rr5 

22:32 Follow up: Response: No adverse reaction; IV Status: Infusion continued upon admission; rr5 

      IV Intake: 400ml                                                                            

                                                                                                  

                                                                                                  

Intake:                                                                                           

20:18 IV: 100ml; Total: 100ml.                                                                rr5 

21:30 IV: 1000ml; Total: 1100ml.                                                              rr5 

22:31 IV: 210ml; Total: 1310ml.                                                               rr5 

22:32 IV: 400ml; Total: 1710ml.                                                               rr5 

                                                                                                  

Outcome:                                                                                          

19:00 Decision to Hospitalize by Provider.                                                    kb  

22:15 Admitted to Med/surg accompanied by tech, via wheelchair, room 221, with chart, Report  rr5 

      called to  cali                                                                           

22:15 Condition: stable                                                                       rr5 

22:15 Instructed on the need for admit.                                                           

22:32 Patient left the ED.                                                                    rr5 

                                                                                                  

Signatures:                                                                                       

Britta Otoole, AIDAN CHAWLAP-Carolina Morales                                 mr                                                   

Lisa Blackwell, RN                     JOHNY   iw                                                   

Maxim Desai RN RN bp Roque, Raymond, RN                      RN   rr5                                                  

Loraine Coulter RN RN   ca1                                                  

                                                                                                  

**************************************************************************************************

## 2020-10-28 LAB
BUN BLD-MCNC: 37 MG/DL (ref 7–18)
CAOX CRY URNS QL MICRO: PRESENT
GLUCOSE SERPLBLD-MCNC: 119 MG/DL (ref 74–106)
HCT VFR BLD CALC: 36.1 % (ref 39.6–49)
INR BLD: 1.87
LYMPHOCYTES # SPEC AUTO: 1.1 K/UL (ref 0.7–4.9)
MAGNESIUM SERPL-MCNC: 2.1 MG/DL (ref 1.8–2.4)
PMV BLD: 7.5 FL (ref 7.6–11.3)
POTASSIUM SERPL-SCNC: 4.7 MMOL/L (ref 3.5–5.1)
RBC # BLD: 4.23 M/UL (ref 4.33–5.43)
UA COMPLETE W REFLEX CULTURE PNL UR: (no result)
UA DIPSTICK W REFLEX MICRO PNL UR: (no result)
URATE SERPL-MCNC: 7.5 MG/DL (ref 3.5–7.2)
URINE COARSE GRANULAR CASTS: (no result) /LPF

## 2020-10-28 RX ADMIN — Medication SCH ML: at 20:26

## 2020-10-28 RX ADMIN — Medication SCH: at 08:04

## 2020-10-28 RX ADMIN — CEFEPIME SCH MLS: 1 INJECTION, POWDER, FOR SOLUTION INTRAMUSCULAR; INTRAVENOUS at 20:26

## 2020-10-28 RX ADMIN — HYOSCYAMINE SULFATE SCH APPL: 16 SOLUTION at 12:39

## 2020-10-28 RX ADMIN — SODIUM CHLORIDE SCH MLS: 0.45 INJECTION, SOLUTION INTRAVENOUS at 13:53

## 2020-10-28 RX ADMIN — CEFEPIME SCH MLS: 1 INJECTION, POWDER, FOR SOLUTION INTRAMUSCULAR; INTRAVENOUS at 08:01

## 2020-10-28 RX ADMIN — LEVOTHYROXINE SODIUM SCH: 0.1 TABLET ORAL at 06:01

## 2020-10-28 NOTE — P.INFCA
Sepsis Focused Assessment





- Focused Assessment Complete?


Sepsis Focused Assessment Completed?: Yes





- Sepsis Screen Result


Severe Sepsis: Negative


Septic Shock: Negative





- Evaluation


Current stage of sepsis: Ruled out


Reason for ruling out sepsis: HR and BP





- Vital Signs


Reviewed: Yes


Temperature: 98.7 F


Heart rate: 81


Blood Pressure: 115/58


Respiratory Rate: 20


O2 Sat by Pulse Oximetry: 98





- Examination


Date exam was performed: 10/28/20


Time exam was performed: 00:35


Heart: Regular rate/rhythm


Lungs: Clear bilaterally


Peripheral pulses: 3+ Normal


Peripheral pulse location: Radial


Capillary refill: <2 Seconds


Skin examination: Normal turgor

## 2020-10-28 NOTE — CON
Date of Consultation:  10/28/2020



Reason For Consultation:  Acute renal failure.



History Of Present Illness:  Mr. Simental is a 74-year-old male with past medical history significant fo
r history of morbid obesity, chronic lower extremity edema, hypothyroidism, and CVA, presented to [facility] because of lower extremity infection with abscess and cellulitis.  The patient has b
een treated for lower extremity cellulitis and Nephrology is being consulted for acute renal insuffic
iency.



Past Medical History:  Significant for history of CVA, alcohol abuse, PET tube placement.



Family History:  Noncontributory.



Social History:  No history of smoking, alcohol use reported.  He lives at home.



Home Medications:  Have been reviewed in detail.



Review of Systems:

Positive for pain and swelling in the left lower extremity.  Denies any chest pain, shortness of edie
th.  Denies any abdominal pain.  Denies any dysuria or hematuria.  All other review of systems are ne
gative.



Physical Examination:

Vital Signs:  At this time are showing temperature of 97.2, pulse rate of 69, respiratory rate of 17,
 and blood pressure 132/60.  

General Examination:  He is a morbidly obese male, in no acute distress.  HEENT:  Shows atraumatic he
ad. 

Lungs:  Auscultation of the lungs revealed bilateral equal air entry. 

Heart:  Auscultation of the heart revealed regular rate and rhythm. 

Extremities:  Showed left lower extremity with cellulitis and noted to be in dressing.



Laboratory Data:  Has been reviewed and are stable.  Creatinine is improving to 1.94 from 2.  Other e
lectrolytes are stable.  CBC showing WBC count of 14,000, hemoglobin of 12.4, hematocrit of 36.1, and
 platelet count of 244.  Urinalysis showing leukocyte esterase 1+, squamous epithelial cells 10-20, a
nd bacteria 20-50.



Current Medications:  Have been reviewed in detail.  Renal ultrasound has also been reviewed.  The pa
tient is on normal saline at 75 cc an hour, vancomycin, and Coumadin and levothyroxine.



Impression:  

1.Acute renal insufficiency secondary to possibly from acute tubular necrosis, currently with improv
ing renal function.

2.Left lower extremity cellulitis.  The patient is getting antibiotics and wound care at this time. 
 Continue antibiotics and monitor vancomycin trough closely.

3.Hypothyroidism.

4.History of cerebrovascular accident with chronic anticoagulation and Coumadin.



Plan:  The patient's renal function is improving.  Renal ultrasound is also stable.  Likely, this is 
ERNST.  He is not on any other medications that can impact his renal function.  Monitor vancomycin trou
gh closely and follow up on renal function.  Avoid nephrotoxins and hypotension.





VV/MODL

DD:  10/28/2020 14:18:52Voice ID:  [id number]

DT:  10/28/2020 14:39:01Report ID:  [id number]

## 2020-10-28 NOTE — P.PN
Subjective


Date of Service: 10/28/20


Primary Care Provider: Dr. Messina


Chief Complaint: Cellulitis left lower extremity


Subjective: Other (Patient reports slight improvement.  No fever noted. Erythema

and pain to the left lower extremity noted.)





Physical Examination





- Vital Signs


Temperature: 97.2 F


Blood Pressure: 132/60


Pulse: 69


Respirations: 17


Pulse Ox (%): 98





- Physical Exam


General: Alert, In no apparent distress, Oriented x3, Cachectic


HEENT: Atraumatic


Neck: Supple


Respiratory: Clear to auscultation bilaterally, Normal air movement


Cardiovascular: Normal pulses, Regular rate/rhythm


Gastrointestinal: Normal bowel sounds, Soft and benign, Non-distended, No 

tenderness, No masses, No rebound, No guarding


Integumentary: Other (Significant edema, erythema to the left lower extremity 

below-the-knee.  This extends to the calf and ankle region.  There is an area of

fluctuance to the lateral calf region.  No significant exiting noted. Changes 

are significant in comparison to his right lower extremity.)


Neurological: Normal speech, Normal strength at 5/5 x4 extr, Normal tone, Normal

affect





- Studies


Laboratory Data (last 24 hrs)





10/27/20 18:00: WBC 18.2 H, Hgb 14.3, Hct 42.8, Plt Count 348


10/27/20 18:00: Sodium 141, Potassium 3.9, BUN 31 H, Creatinine 2.00 H, Glucose 

152 H, Total Bilirubin 1.5 H, AST 16, ALT 19, Alkaline Phosphatase 74, Lipase 

139





Medications List Reviewed: Yes





Assessment & Plan


Discharge Plan: Home


Plan to discharge in: 72 Hours


Physician Review Additional Text: 








Assessment


Cellulitis left lower extremity complicated with abscess to the left lateral 

calf area off failed outpatient therapy


ERNST on CKD 3


Hypothyroidism


History of CVA on chronic anticoagulation therapy with Coumadin





Plan


Cellulitis left lower extremity complicated with abscess to the left lateral 

calf area, failed outpatient therapy:  Patient previously on doxycycline and 

rifampin.  This has been discontinued.  Venous Doppler shows no DVT.  Continue 

IV antibiotic therapy.  Antibiotic therapy with cefepime/vancomycin.  Case 

discussed at length with surgery.  No surgical intervention needed at this time.

 Continue Dakin's and wound care.  Elevate when sitting or lying in.  May apply 

ice pack to the area.  Continue IV fluids.  Nephrology consulted to address 

possible acute on chronic renal disease.  Renal ultrasound pending.  Anticipate 

improvement over the next 72 hr.  Will continue monitor closely.


ERNST on CKD 3:  Continue IV fluids.  Obtain renal ultrasound.  Await 

recommendations by Nephrology.


Hypothyroidism:  Patient just started taking levothyroxine 100 mcg.  Continue 

medication.


History of CVA on chronic anticoagulation therapy with Coumadin:  Continue 

Coumadin at this time.  Monitor INR.  Maintain INR 2-3.





Time Spent Managing Pts Care (In Minutes): 55

## 2020-10-28 NOTE — RAD REPORT
EXAM DESCRIPTION:  US - Extremity Venous Uni Ltd - 10/28/2020 8:41 am

 

CLINICAL HISTORY:  R/O DVT, Evaluate abscess to LLE

Leg swelling and edema.

 

COMPARISON:  Extrem Venous W Compress [place] dated 2/1/2020

 

FINDINGS:  Left lower extremity venous system was interrogated with Doppler technique. Normal flow, c
ompressibility and augmentation was noted. There is no DVT present. Small complex fluid collection is
 seen lateral left calf.

 

IMPRESSION:  No evidence of left lower extremity deep venous thrombosis.

## 2020-10-28 NOTE — RAD REPORT
EXAM DESCRIPTION:  US - Renal Ultrasound-Complete - 10/28/2020 8:41 am

 

CLINICAL HISTORY:  ERNST

Flank pain

 

COMPARISON:  No comparisons

 

FINDINGS:  Both kidneys are normal in size, shape and echotexture.

 

The right kidney measures 10.5 x 5.5 x 5.1 cm. No hydronephrosis, focal mass or perinephric fluid.

 

The left kidney measures 10.8 x 5.6 x 3.5 cm. No hydronephrosis, focal mass or perinephric fluid.

 

The urinary bladder is incompletely distended without gross abnormality seen. Several gallstones are 
present.

 

IMPRESSION:  Unremarkable renal sonogram.

 

Cholelithiasis.

## 2020-10-28 NOTE — CON
Date of Consultation:  10/28/2020



Brief History Of Present Illness:  Patient is a 74-year-old  male with past medical history 
of hypothyroidism and CVA approximately 5 years ago, on chronic warfarin anticoagulation who presents
 to the emergency department with worsening rash and cellulitis of his bilateral lower extremities, l
eft greater than right.  He reports that he has had redness of this extremity for approximately 2 wee
ks prior to his admission and had an abscess on the posterior aspect near his calf, which had drained
 out spontaneously.  He had been placed on rifampin by his primary care physician for the cellulitis,
 but was concerned about worsening drainage and tenderness and redness to the lower extremity as such
 I was consulted to evaluate the patient for possible surgical debridement.



Past Medical History:  Significant for CVA, alcohol abuse, PEG tube.



Past Surgical History:  PEG tube placement.



Home Medications:  Include rifampin, Coumadin, Omnicef.



Allergies:  NO KNOWN DRUG ALLERGIES.



Social History:  Smoking; he denies smoking.  Drinks alcohol recreationally.  Denies recreational katherin
g use.



Family History:  Consistent with GI disease in his father who had ulcers.  His mother had Alzheimer's
.



Review of Systems:

10-point review of systems other than HPI, denies.



Physical Examination:

Vital Signs:  At the time examination his BMI was 39.7.  His vital signs were blood pressure 117/59, 
pulse is 83, respiratory rate 18, temperature 98.5. 

General:  He is awake, alert, and oriented.  Psychiatric appropriate.  Conversive. 

HEENT:  He is normocephalic.  Sclerae icteric.  Mucous is moist.  Oropharynx clear. 

Neck:  Supple.  No JVD. 

Chest:  Normal to expansion and excursion. 

Cardiovascular:  Regular rate and rhythm. 

Pulmonary:  Clear to auscultation bilaterally.  

Extremities:  Focused examination of bilateral lower extremities; he has cellulitis and swelling to t
he left lower extremity greater than right.  He has bilateral pitting edema in bilateral lower extrem
ities, but left greater than right with cellulitic changes extending from the foot to approximately t
he 2-3 inches below the tibial plateau.  Now it is circumferential redness.  There are no drainable c
ollections.  He does have a small wound on the lateral aspect of the calf which is approximately 2 cm
 inside, around.  No drainable collections appreciated in the entire lower extremity.  

Skin:  Examination was otherwise unremarkable.



Laboratory Data:  Reveals a white blood count 14.2, hemoglobin 12.4, hematocrit 36.1, platelet count 
was 244.  His neutrophils are 84%.  His PT was 21.8, INR 1.87, PTT is 32.9.  His sodium 142, potassiu
m 4.7, chloride 109, carbon dioxide 26, BUN 37, creatinine 1.9, glucose is 119.  Lactic acid was 3.4 
on admission, now 1.3.  His calcium was 7.6.  His total bilirubin was 1.5, direct bilirubin 0.6, AST 
16, ALT 19, alkaline phosphatase 73.  His creatine kinase is 338.  His lipase is 139.  His procalcito
emely 0.39.  He had 20/50 bacteria in his urinalysis, as well as squamous cells.  He did not have any i
maging.



Assessment And Plan:  This is a 74-year-old male with cellulitis of the left lower extremity with sma
ll open wound.  

1.IV fluid hydration.

2.Antibiotic coverage.

3.Serial exams. 

4.Dakin solution to the wound and wrapped with gentle compression, elevate and cool packs to the low
er extremity.  

5.Continue medical management.  There is no surgical indication at this point; however, follow along
 with you.  

Thank you for this interesting consult.





JULIETTE/KYLER

DD:  10/28/2020 08:21:52Voice ID:  [id number]

DT:  10/28/2020 10:26:58Report ID:  [id number]

## 2020-10-29 VITALS — SYSTOLIC BLOOD PRESSURE: 147 MMHG | TEMPERATURE: 97 F | DIASTOLIC BLOOD PRESSURE: 68 MMHG

## 2020-10-29 VITALS — OXYGEN SATURATION: 95 %

## 2020-10-29 LAB
BUN BLD-MCNC: 25 MG/DL (ref 7–18)
GLUCOSE SERPLBLD-MCNC: 94 MG/DL (ref 74–106)
HCT VFR BLD CALC: 32.8 % (ref 39.6–49)
LYMPHOCYTES # SPEC AUTO: 0.9 K/UL (ref 0.7–4.9)
MAGNESIUM SERPL-MCNC: 2.1 MG/DL (ref 1.8–2.4)
PMV BLD: 7.5 FL (ref 7.6–11.3)
POTASSIUM SERPL-SCNC: 4.3 MMOL/L (ref 3.5–5.1)
RBC # BLD: 3.79 M/UL (ref 4.33–5.43)

## 2020-10-29 RX ADMIN — LEVOTHYROXINE SODIUM SCH MG: 0.1 TABLET ORAL at 05:43

## 2020-10-29 RX ADMIN — CEFEPIME SCH MLS: 1 INJECTION, POWDER, FOR SOLUTION INTRAMUSCULAR; INTRAVENOUS at 08:21

## 2020-10-29 RX ADMIN — Medication SCH ML: at 08:21

## 2020-10-29 RX ADMIN — SODIUM CHLORIDE SCH MLS: 0.45 INJECTION, SOLUTION INTRAVENOUS at 04:04

## 2020-10-29 RX ADMIN — HYOSCYAMINE SULFATE SCH APPL: 16 SOLUTION at 13:32

## 2020-10-29 NOTE — P.PN
Subjective


Date of Service: 10/29/20


Primary Care Provider: Dr. Messina


Chief Complaint: Cellulitis left lower extremity


Subjective: Improving, Other (Patient feeling a lot better)





Physical Examination





- Vital Signs


Temperature: 98.0 F


Blood Pressure: 130/60


Pulse: 73


Respirations: 18


Pulse Ox (%): 92





- Physical Exam


General: Alert, In no apparent distress, Oriented x3, Cooperative


HEENT: Atraumatic


Neck: Supple


Respiratory: Clear to auscultation bilaterally, Normal air movement


Cardiovascular: Normal pulses, Regular rate/rhythm


Gastrointestinal: Normal bowel sounds, Soft and benign, Non-distended, No 

tenderness, No masses, No rebound, No guarding


Integumentary: Other (Erythema to the lower extremity left side significantly 

improved.  Also less swelling noted to the left lower extremity.)


Neurological: Normal speech, Normal strength at 5/5 x4 extr, Normal tone, Normal

affect





- Studies


Medications List Reviewed: Yes





Assessment & Plan


Discharge Plan: Home


Plan to discharge in: 24 Hours


Physician Review Additional Text: 








Assessment


Cellulitis left lower extremity complicated with abscess to the left lateral ca

lf area off failed outpatient therapy


ERNST likely related to above


Hypothyroidism


History of CVA on chronic anticoagulation therapy with Coumadin





Plan


Cellulitis left lower extremity complicated with abscess to the left lateral 

calf area, failed outpatient therapy:  Overall patient has significantly 

improved.  Continue IV antibiotic therapy.  Continue to elevate when sitting or 

lying.  Will discuss with surgery about improvement.  Patient desires to go home

later today.  Possible discharge as early as today but will discuss with 

surgery.  May need an extra day of IV antibiotic therapy.  Venous Doppler 

negative.  Continue to monitor closely.  Will reassess later today.


ERNST likely related to above:  Renal ultrasound unremarkable.  Renal function 

improved with IV fluids.  Doubt underlying chronic renal disease. Will discuss 

further with nephrology.  


Hypothyroidism:  Continue with home medication levothyroxine 100 mcg.  


History of CVA on chronic anticoagulation therapy with Coumadin:  Continue 

Coumadin at this time.  Monitor INR.  Maintain INR 2-3.





Time Spent Managing Pts Care (In Minutes): 55

## 2020-10-29 NOTE — P.DS
Admission Date: 10/27/20


Discharge Date: 10/29/20


Primary Care Provider: Dr. Messina


Disposition: ROUTINE DISCHARGE


Discharge Condition: GOOD


Reason for Admission: Cellulitis left lower extremity


Consultations: 





Surgery-Dr. Baltazar


Nephrology-Dr. Ruano





Procedures: 





COVID: 


Negative





Renal US: 


FINDINGS:  Both kidneys are normal in size, shape and echotexture. 


The right kidney measures 10.5 x 5.5 x 5.1 cm. No hydronephrosis, focal mass or 

perinephric fluid. 


The left kidney measures 10.8 x 5.6 x 3.5 cm. No hydronephrosis, focal mass or 

perinephric fluid. 


The urinary bladder is incompletely distended without gross abnormality seen. 

Several gallstones are present. 


IMPRESSION:  Unremarkable renal sonogram. 


Cholelithiasis.





Venous doppler: 


COMPARISON:  Extrem Venous W Compress [facility] dated 2/1/2020 


FINDINGS:  Left lower extremity venous system was interrogated with Doppler 

technique. Normal flow, compressibility and augmentation was noted. There is no 

DVT present. Small complex fluid collection is seen lateral left calf. 


IMPRESSION:  No evidence of left lower extremity deep venous thrombosis.





CXR: 


COMPARISON:  February 2020 


FINDINGS:   The lungs appear clear of acute infiltrate. The heart is mildly 

enlarged 


IMPRESSION:   No acute abnormalities displayed





Medical Problem List: 


Cellulitis left lower extremity complicated without abscess to the left lateral 

calf area, failed outpatient therapy


ERNST likely related to above


Hypothyroidism


History of CVA on chronic anticoagulation therapy with Coumadin





Brief History of Present Illness: 





74-year-old  male with history of hypothyroidism and CVA approximately 

5 years ago on chronic anticoagulation therapy with warfarin presents emergency 

department for rash/cellulitis of left lower extremity.  Patient reports that he

has had redness of left lower extremity with abscess for the last approximately 

2 weeks, patient reportedly and has been taking rifampin that was prescribed by 

his PCP for the cellulitis left lower extremity.  Patient was evaluated in the 

emergency department found to have an elevated white blood cell count at 18,000,

small abscess to the left lateral calf area draining small amount of serosanguin

eous/purulent drainage.  Patient reports his history of eczema and frequently 

has small abscesses pop up.  During his evaluation in the emergency department 

also noted that his creatinine was elevated at 2.0, GFR 33, BUN 31.  Patient's 

baseline GFR at this time appears to be around 45. ED provider wishes to admit 

patient for further evaluation and management.





Hospital Course: 





Patient presented with cellulitis to the left lower extremity without abscess to

the left lower extremity/left lateral calf area.  Patient had failed outpatient 

therapy.  Patient was admitted for further evaluation and treatment.  Patient 

responded well to IV antibiotic therapy.  Patient was seen by surgery.  No 

surgical intervention was required.  At discharge, erythema, swelling has signi

ficantly improved.  At discharge he will continue with current wound care.  At 

discharge patient will continue with Augmentin 500 mg 1 pill twice daily and 

doxycycline 100 mg 1 pill twice daily for 7 days.  Recommendation is for the 

patient to follow up with surgery in 1 week to follow up this hospitalization 

and to continue his care.





Patient also had acute renal injury this was likely related to above and 

dehydration.  Renal function improved and back to baseline.  Nephrology was 

consulted.  Renal ultrasound unremarkable.  Recommend to recheck lab-BMP in 1-2 

weeks to monitor his progress.  Patient may follow up with nephrology if this 

persists.





Patient with hypothyroidism.  At discharge patient may continue with his current

medication.  Recommendation is to recheck tsh and free T4 in 4-6 weeks to 

monitor his progress.  Further adjustment can be done by his PCP.





Patient with history of CVA on chronic anti coagulation therapy-Coumadin.  This 

has remained stable.  At discharge patient will continue with current Coumadin 

regimen.  Recommend to monitor his INR as scheduled.  Will recommend to recheck 

INR in 5 days as the patient will be on antibiotic therapy which may interfere 

with his Coumadin.  Recommend to maintain INR between 2 and 3.  Follow up with 

PCP to further monitor and adjust medication. 





Incidental finding of gallstones noted on ultrasound.  Education provided.  If 

he has pain in the future patient may see surgery to further evaluate.


Vital Signs/Physical Exam: 














Temp Pulse Resp BP Pulse Ox


 


 97.0 F   65   17   147/68 H  98 


 


 10/29/20 12:00  10/29/20 12:00  10/29/20 12:00  10/29/20 12:00  10/29/20 12:00








General: Alert, In no apparent distress, Oriented x3, Cooperative


HEENT: Atraumatic


Neck: Supple


Respiratory: Clear to auscultation bilaterally, Normal air movement


Cardiovascular: Normal pulses, Regular rate/rhythm


Gastrointestinal: Normal bowel sounds, Soft and benign, Non-distended, No 

tenderness, No masses, No rebound, No guarding


Musculoskeletal: No erythema, No tenderness, No warmth


Integumentary: No erythema, No warmth, No cyanosis, Other (significantly 

improved edema. No erythema noted. )


Neurological: Normal speech, Normal strength at 5/5 x4 extr, Normal tone, Normal

affect, Abnormal affect


Laboratory Data at Discharge: 














WBC  7.4 K/uL (4.3-10.9)  D 10/29/20  05:14    


 


Hgb  11.1 g/dL (13.6-17.9)  L  10/29/20  05:14    


 


Hct  32.8 % (39.6-49.0)  L  10/29/20  05:14    


 


Plt Count  180 K/uL (152-406)  D 10/29/20  05:14    


 


PT  21.8 SECONDS (9.5-12.5)  H  10/28/20  03:32    


 


INR  1.87   10/28/20  03:32    


 


APTT  32.6 SECONDS (24.3-36.9)   10/28/20  03:32    


 


Sodium  141 mmol/L (136-145)   10/29/20  05:14    


 


Potassium  4.3 mmol/L (3.5-5.1)   10/29/20  05:14    


 


BUN  25 mg/dL (7-18)  H  10/29/20  05:14    


 


Creatinine  1.20 mg/dL (0.55-1.3)   10/29/20  05:14    


 


Glucose  94 mg/dL ()   10/29/20  05:14    


 


Uric Acid  7.5 mg/dL (3.5-7.2)  H  10/28/20  03:32    


 


Magnesium  2.1 mg/dL (1.8-2.4)   10/29/20  05:14    


 


Total Bilirubin  1.5 mg/dL (0.2-1.0)  H  10/27/20  18:00    


 


AST  16 U/L (15-37)   10/27/20  18:00    


 


ALT  19 U/L (12-78)   10/27/20  18:00    


 


Alkaline Phosphatase  74 U/L ()   10/27/20  18:00    


 


Lipase  139 U/L ()   10/27/20  18:00    








Home Medications: 








Acetaminophen 500 mg PO Q6HP PRN 02/01/20 


Warfarin Sodium [Coumadin*] 5 mg PO DAILY 02/01/20 


Amox/Clavulanate [Augmentin 500-125 mg Tab] 500 mg PO BID #14 tab 10/29/20 


Doxycycline Hyclate 100 mg PO BID #14 tablet 10/29/20 





New Medications: 


Amox/Clavulanate [Augmentin 500-125 mg Tab] 500 mg PO BID #14 tab


Doxycycline Hyclate 100 mg PO BID #14 tablet


Patient Discharge Instructions: 1.  Follow up with PCP in 1 week to follow up 

this hospitalization.  2.  Patient presented with cellulitis to the left lower 

extremity without abscess to the left lower extremity/left lateral calf area.  

Patient had failed outpatient therapy.  Patient was admitted for further 

evaluation and treatment.  Patient responded well to IV antibiotic therapy.  Farhan umaña was seen by surgery.  No surgical intervention was required.  At 

discharge, erythema, swelling has significantly improved.  At discharge he will 

continue with current wound care.  At discharge patient will continue with 

Augmentin 500 mg 1 pill twice daily and doxycycline 100 mg 1 pill twice daily 

for 7 days.  Recommendation is for the patient to follow up with surgery in 1 

week to follow up this hospitalization and to continue his care.  3.  Patient 

also had acute renal injury this was likely related to above and dehydration.  

Renal function improved and back to baseline.  Nephrology was consulted.  Renal 

ultrasound unremarkable.  Recommend to recheck lab-BMP in 1-2 weeks to monitor 

his progress.  Patient may follow up with nephrology if this persists.  4.  

Patient with hypothyroidism.  At discharge patient may continue with his current

medication.  Recommendation is to recheck tsh and free T4 in 4-6 weeks to 

monitor his progress.  Further adjustment can be done by his PCP.  5.  Patient 

with history of CVA on chronic anti coagulation therapy-Coumadin.  This has 

remained stable.  At discharge patient will continue with current Coumadin 

regimen.  Recommend to monitor his INR as scheduled.  Will recommend to recheck 

INR in 5 days as the patient will be on antibiotic therapy which may interfere 

with his Coumadin.  Recommend to maintain INR between 2 and 3.  Follow up with 

PCP to further monitor and adjust medication.  6.  Incidental finding of 

gallstones noted on ultrasound.  Education provided.  If he has pain in the 

future patient may see surgery to further evaluate.


Diet: AHA


Activity: Ad jimmie


Followup: 


Ketan Messina MD [Primary Care Provider] - 


Time spent managing pt's care (in minutes): 55

## 2020-10-29 NOTE — RAD REPORT
EXAM DESCRIPTION:  Bing Single View10/29/2020 6:57 am

 

CLINICAL HISTORY:  Shortness breath

 

COMPARISON:  February 2020

 

FINDINGS:   The lungs appear clear of acute infiltrate. The heart is mildly enlarged

 

IMPRESSION:   No acute abnormalities displayed

## 2020-10-29 NOTE — P.PN
Date of Service: 10/29/20


Vital Signs











Temp Pulse Resp BP Pulse Ox


 


 97.0 F   65   17   147/68 H  98 


 


 10/29/20 12:00  10/29/20 12:00  10/29/20 12:00  10/29/20 12:00  10/29/20 12:00








Assessment/ Plan: 





Nephrology





CPS stable without CP or SOB.


No acute events overnight.


Feeling better.  Improved urine output.





Vitals, medications, blood work and imaging reviewed in the chart.


NAD.  Obese.  MMM.  Neck supple.  CTA.  RRR.  Soft Abd.  No C/C.  LE Edema 1+.  

No rash.  AAO.  Normal Speech. 





A/


ERNST


Hypocalcemia


HTN with CKD


CKD III with proteinuria


Cellulitis of LLE


Morbid Obesity





P/ Continue current POC and Medications.


Continue IVF.


Continue abx.


Low sodium diet.


AM labs.  Daily weight.


No NSAIDs.

## 2021-03-08 ENCOUNTER — HOSPITAL ENCOUNTER (EMERGENCY)
Dept: HOSPITAL 97 - ER | Age: 76
Discharge: HOME | End: 2021-03-08
Payer: COMMERCIAL

## 2021-03-08 VITALS — TEMPERATURE: 97.9 F

## 2021-03-08 VITALS — DIASTOLIC BLOOD PRESSURE: 58 MMHG | SYSTOLIC BLOOD PRESSURE: 112 MMHG

## 2021-03-08 VITALS — OXYGEN SATURATION: 100 %

## 2021-03-08 DIAGNOSIS — I10: ICD-10-CM

## 2021-03-08 DIAGNOSIS — Z88.2: ICD-10-CM

## 2021-03-08 DIAGNOSIS — L30.9: Primary | ICD-10-CM

## 2021-03-08 LAB
ALBUMIN SERPL BCP-MCNC: 3 G/DL (ref 3.4–5)
ALP SERPL-CCNC: 59 U/L (ref 45–117)
ALT SERPL W P-5'-P-CCNC: 18 U/L (ref 12–78)
AST SERPL W P-5'-P-CCNC: 21 U/L (ref 15–37)
BUN BLD-MCNC: 32 MG/DL (ref 7–18)
GLUCOSE SERPLBLD-MCNC: 107 MG/DL (ref 74–106)
HCT VFR BLD CALC: 35.3 % (ref 39.6–49)
INR BLD: 1.28
LYMPHOCYTES # SPEC AUTO: 0.6 K/UL (ref 0.7–4.9)
MAGNESIUM SERPL-MCNC: 1.8 MG/DL (ref 1.8–2.4)
NT-PROBNP SERPL-MCNC: 1887 PG/ML (ref ?–125)
PMV BLD: 7.2 FL (ref 7.6–11.3)
POTASSIUM SERPL-SCNC: 4.3 MMOL/L (ref 3.5–5.1)
RBC # BLD: 4.02 M/UL (ref 4.33–5.43)
TROPONIN (EMERG DEPT USE ONLY): < 0.02 NG/ML (ref 0–0.04)

## 2021-03-08 PROCEDURE — 99285 EMERGENCY DEPT VISIT HI MDM: CPT

## 2021-03-08 PROCEDURE — 83735 ASSAY OF MAGNESIUM: CPT

## 2021-03-08 PROCEDURE — 93005 ELECTROCARDIOGRAM TRACING: CPT

## 2021-03-08 PROCEDURE — 36415 COLL VENOUS BLD VENIPUNCTURE: CPT

## 2021-03-08 PROCEDURE — 96375 TX/PRO/DX INJ NEW DRUG ADDON: CPT

## 2021-03-08 PROCEDURE — 83880 ASSAY OF NATRIURETIC PEPTIDE: CPT

## 2021-03-08 PROCEDURE — 71045 X-RAY EXAM CHEST 1 VIEW: CPT

## 2021-03-08 PROCEDURE — 85610 PROTHROMBIN TIME: CPT

## 2021-03-08 PROCEDURE — 80048 BASIC METABOLIC PNL TOTAL CA: CPT

## 2021-03-08 PROCEDURE — 80076 HEPATIC FUNCTION PANEL: CPT

## 2021-03-08 PROCEDURE — 96374 THER/PROPH/DIAG INJ IV PUSH: CPT

## 2021-03-08 PROCEDURE — 81003 URINALYSIS AUTO W/O SCOPE: CPT

## 2021-03-08 PROCEDURE — 85025 COMPLETE CBC W/AUTO DIFF WBC: CPT

## 2021-03-08 PROCEDURE — 84484 ASSAY OF TROPONIN QUANT: CPT

## 2021-03-08 NOTE — RAD REPORT
EXAM DESCRIPTION:  RAD - Chest Single View - 3/8/2021 3:03 pm

 

CLINICAL HISTORY:  DYSPNEA

Chest pain.

 

COMPARISON:  <Comparisons>

 

FINDINGS:  Portable technique limits examination quality.

 

Mild interstitial pulmonary edema seen. The heart is moderately enlarged in size. No displaced fractu
res.

 

IMPRESSION:  Mild CHF. Observation Stay Multiple Days

## 2021-03-08 NOTE — ER
Nurse's Notes                                                                                     

 [facility]                                                                 

Name: Anthony Simental                                                                                

Age: 75 yrs                                                                                       

Sex: Male                                                                                         

: 1945                                                                                   

MRN: [id number]                                                                                   

Arrival Date: 2021                                                                          

Time: 11:01                                                                                       

Account#: [id number]                                                                            

Bed 15                                                                                            

Private MD:                                                                                       

Diagnosis: Lower Extremity Edema;Dermatitis, unspecified                                          

                                                                                                  

Presentation:                                                                                     

                                                                                             

11:07 Coronavirus screen: Client denies travel out of the [place] in the last 14 days. At this   ll1 

      time, the client does not indicate any symptoms associated with coronavirus-19. Ebola       

      Screen: Patient denies travel to an Ebola-affected area in the 21 days before illness       

      onset. Initial Sepsis Screen: Does the patient meet any 2 criteria? No. Patient's           

      initial sepsis screen is negative. Does the patient have a suspected source of              

      infection? Yes: Other: leg swelling. Risk Assessment: Do you want to hurt yourself or       

      someone else? Patient reports no desire to harm self or others. Onset of symptoms was       

      2021.                                                                          

11:07 Method Of Arrival: Ambulatory                                                           ll1 

11:07 Acuity: JUAN C 3                                                                           ll1 

11:09 Chief complaint: Patient states: Bilateral leg swelling for 2 weeks. Reports clear      ll1 

      drainage at times. Both arms are red and swollen also. No fever. Has wraps in place to      

      BLE for lymphedema.                                                                         

                                                                                                  

Historical:                                                                                       

- Allergies:                                                                                      

11:06 Sulfa (Sulfonamide Antibiotics);                                                        ll1 

- PMHx:                                                                                           

11:06 CVA; Hypertension;                                                                      ll1 

- PSHx:                                                                                           

11:06 None;                                                                                   ll1 

                                                                                                  

- Immunization history:: Flu vaccine is not up to date. Client reports receiving the              

  1st dose of the Covid vaccine.                                                                  

- Social history:: Smoking status: Patient denies any tobacco usage or history of.                

                                                                                                  

                                                                                                  

Screening:                                                                                        

15:01 Abuse screen: Denies threats or abuse. Denies injuries from another. Nutritional        hb  

      screening: No deficits noted. Tuberculosis screening: No symptoms or risk factors           

      identified. Fall Risk None identified.                                                      

                                                                                                  

Assessment:                                                                                       

14:59 General: Appears in no apparent distress. Behavior is calm, cooperative. Pain: Pain     hb  

      currently is 4 out of 10 on a pain scale. Neuro: Level of Consciousness is awake,           

      alert, obeys commands, Oriented to person, place, time, situation. Cardiovascular:          

      Capillary refill < 3 seconds Patient's skin is warm and dry. Respiratory: Respiratory       

      effort is even, unlabored, Respiratory pattern is regular, symmetrical. GI: No signs        

      and/or symptoms were reported involving the gastrointestinal system. : No signs           

      and/or symptoms were reported regarding the genitourinary system. EENT: No signs and/or     

      symptoms were reported regarding the EENT system. Derm: Skin is pink, warm \T\ dry. Rash    

      noted that is macular, red, scaly, diffuse. Musculoskeletal: Reports BLE pain.              

16:16 Reassessment: Patient appears in no apparent distress at this time. Patient and/or      hb  

      family updated on plan of care and expected duration. Pain level reassessed. Patient is     

      alert, oriented x 3, equal unlabored respirations, skin warm/dry/pink.                      

17:00 Reassessment: Patient appears in no apparent distress at this time. Patient and/or      hb  

      family updated on plan of care and expected duration. Pain level reassessed. Patient is     

      alert, oriented x 3, equal unlabored respirations, skin warm/dry/pink.                      

                                                                                                  

Vital Signs:                                                                                      

11:07  / 92; Pulse 87; Resp 20; Temp 97.9; Pulse Ox 97% ; Weight 87.54 kg; Height 6 ft. ll1 

      0 in. (182.88 cm); Pain 3/10;                                                               

16:00  / 46; Pulse 78; Resp 19; Pulse Ox 100% on R/A;                                   hb  

17:00  / 58; Pulse 74; Resp 18; Pulse Ox 100% on R/A;                                   hb  

11:07 Body Mass Index 26.18 (87.54 kg, 182.88 cm)                                             ll1 

                                                                                                  

ED Course:                                                                                        

11:01 Patient arrived in ED.                                                                  ds1 

11:06 Arm band placed on.                                                                     ll1 

11:07 Triage completed.                                                                       ll1 

11:08 Patient notified of wait time.                                                          ll1 

14:07 Scout Lepe PA is PHCP.                                                               jr8 

14:07 Homar Olson MD is Attending Physician.                                             jr8 

14:25 Dina Rand, RN is Primary Nurse.                                                   hb  

14:35 Missed attempt(s): 22 gauge in right forearm.                                           jp3 

14:45 Missed attempt(s): 20 gauge in right Bleeding controlled, band aid applied, catheter    jp3 

      tip intact.                                                                                 

14:50 Initial lab(s) drawn, by me, sent to lab. EKG done, by ED staff, reviewed by Scout SOUSA. Inserted saline lock: 20 gauge in right antecubital area, using aseptic          

      technique. Blood collected.                                                                 

14:50 Patient maintains SpO2 saturation greater than 95% on room air.                         jp3 

14:58 Placed in gown. Bed in low position. Call light in reach. Side rails up X2. Warm        jp3 

      blanket given. Verbal reassurance given. Elevated leg. Cardiac monitor on. Pulse ox on.     

      NIBP on.                                                                                    

15:03 XRAY Chest (1 view) In Process Unspecified.                                             EDMS

16:46 Panchito Naranjo MD is Referral Physician.                                               jr8 

17:22 No provider procedures requiring assistance completed. IV discontinued, intact,         hb  

      bleeding controlled, No redness/swelling at site.                                           

                                                                                                  

Administered Medications:                                                                         

14:58 Drug: Lasix 60 mg Route: IVP; Site: right antecubital;                                  hb  

14:59 Drug: SOLU-Medrol 125 mg Route: IVP; Site: right antecubital;                           hb  

                                                                                                  

                                                                                                  

Outcome:                                                                                          

16:46 Discharge ordered by MD.                                                                [id number] 

17:22 Discharged to home ambulatory.                                                          hb  

17:22 Condition: stable                                                                           

17:22 Discharge instructions given to patient, Instructed on discharge instructions, follow       

      up and referral plans. medication usage, Demonstrated understanding of instructions,        

      follow-up care, medications, Prescriptions given X 1.                                       

17:30 Patient left the ED.                                                                    ss  

                                                                                                  

Signatures:                                                                                       

Dispatcher [employer]                           EDMS                                                 

Chantel Ken                                ds1                                                  

Kathrine Tinoco RN                      RN   Scout Oakley PA PA   jr8                                                  

Dina Rand RN                     RN                                                      

Tobi Shields                              jp3                                                  

Addie Batista, RN                       RN   ll1                                                  

                                                                                                  

Corrections: (The following items were deleted from the chart)                                    

11:10 11:09 Chief complaint: Patient states: Bilateral leg swelling for 2 weeks. Reports      ll1 

      clear drainage at times. Both arms are red and swollen also. No fever. ll1                  

                                                                                                  

**************************************************************************************************

## 2021-03-08 NOTE — EDPHYS
Physician Documentation                                                                           

 [facility]                                                                 

Name: Anthony Simental                                                                                

Age: 75 yrs                                                                                       

Sex: Male                                                                                         

: 1945                                                                                   

MRN: [id number]                                                                                   

Arrival Date: 2021                                                                          

Time: 11:01                                                                                       

Account#: [id number]                                                                            

Bed 15                                                                                            

Private MD:                                                                                       

ED Physician Homar Olson                                                                      

HPI:                                                                                              

                                                                                             

14:35 This 75 yrs old  Male presents to ER via Ambulatory with complaints of Leg     jr8 

      Swelling.                                                                                   

14:35 Patient stated that he has been having increased fluid retention diffusely. Now to the  jr8 

      point where he is getting pain. Saw vein specialist the other day and stated that he        

      does have some degree of venous insufficiency . Severity of symptoms: At their worst        

      the symptoms were moderate in the emergency department the symptoms are unchanged. It       

      is unknown whether or not the patient has had similar symptoms in the past. The patient     

      has been recently seen by a physician:.                                                     

                                                                                                  

Historical:                                                                                       

- Allergies:                                                                                      

11:06 Sulfa (Sulfonamide Antibiotics);                                                        ll1 

- PMHx:                                                                                           

11:06 CVA; Hypertension;                                                                      ll1 

- PSHx:                                                                                           

11:06 None;                                                                                   ll1 

                                                                                                  

- Immunization history:: Flu vaccine is not up to date. Client reports receiving the              

  1st dose of the Covid vaccine.                                                                  

- Social history:: Smoking status: Patient denies any tobacco usage or history of.                

                                                                                                  

                                                                                                  

ROS:                                                                                              

14:35 Eyes: Negative for injury, pain, redness, and discharge, ENT: Negative for injury,      jr8 

      pain, and discharge, Neck: Negative for injury, pain, and swelling, Cardiovascular:         

      Negative for chest pain, palpitations, and edema, Respiratory: Negative for shortness       

      of breath, cough, wheezing, and pleuritic chest pain, Abdomen/GI: Negative for              

      abdominal pain, nausea, vomiting, diarrhea, and constipation, Back: Negative for injury     

      and pain, Neuro: Negative for headache, weakness, numbness, tingling, and seizure.          

14:35 MS/extremity: Positive for pain, swelling.                                                  

14:35 Skin: Positive for erythema, rash.                                                          

                                                                                                  

Exam:                                                                                             

14:35 Eyes:  Pupils equal round and reactive to light, extra-ocular motions intact.  Lids and jr8 

      lashes normal.  Conjunctiva and sclera are non-icteric and not injected.  Cornea within     

      normal limits.  Periorbital areas with no swelling, redness, or edema. ENT:  Nares          

      patent. No nasal discharge, no septal abnormalities noted.  Tympanic membranes are          

      normal and external auditory canals are clear.  Oropharynx with no redness, swelling,       

      or masses, exudates, or evidence of obstruction, uvula midline.  Mucous membranes           

      moist. Neck:  Trachea midline, no thyromegaly or masses palpated, and no cervical           

      lymphadenopathy.  Supple, full range of motion without nuchal rigidity, or vertebral        

      point tenderness.  No Meningismus. Respiratory:  Lungs have equal breath sounds             

      bilaterally, clear to auscultation and percussion.  No rales, rhonchi or wheezes noted.     

       No increased work of breathing, no retractions or nasal flaring. Abdomen/GI:  Soft,        

      non-tender, with normal bowel sounds.  No distension or tympany.  No guarding or            

      rebound.  No evidence of tenderness throughout. Back:  No spinal tenderness.  No            

      costovertebral tenderness.  Full range of motion. MS/ Extremity:  Pulses equal, no          

      cyanosis.  Neurovascular intact.  Full, normal range of motion. Neuro:  Awake and           

      alert, GCS 15, oriented to person, place, time, and situation.  Cranial nerves II-XII       

      grossly intact.  Motor strength 5/5 in all extremities.  Sensory grossly intact.            

      Cerebellar exam normal.  Normal gait.                                                       

14:35 Cardiovascular: Rate: normal, Rhythm: regular, Pulses: Pulses are 1+ in right dorsalis      

      pedis artery and left dorsalis pedis artery. Pulses are 2+ in right radial artery and       

      left radial artery. Heart sounds: normal, normal S1and S2, no S3 or S4, no murmur, no       

      rub, no gallop, Edema: 2+ edema to level of left midcalf, left ankle, left foot, right      

      midcalf, right ankle and right foot, non pitting edema to upper legs noted.                 

14:35 Skin: Patient has diffuse redness with excoriations and dryness. Some petechia noted to     

      abdomen .                                                                                   

15:42 ECG was reviewed by the Attending Physician.                                            jr8 

                                                                                                  

Vital Signs:                                                                                      

11:07  / 92; Pulse 87; Resp 20; Temp 97.9; Pulse Ox 97% ; Weight 87.54 kg; Height 6 ft. ll1 

      0 in. (182.88 cm); Pain 3/10;                                                               

16:00  / 46; Pulse 78; Resp 19; Pulse Ox 100% on R/A;                                   hb  

17:00  / 58; Pulse 74; Resp 18; Pulse Ox 100% on R/A;                                   hb  

11:07 Body Mass Index 26.18 (87.54 kg, 182.88 cm)                                             ll1 

                                                                                                  

MDM:                                                                                              

14:08 Patient medically screened.                                                             [facility] 

16:41 Data reviewed: vital signs, nurses notes, lab test result(s), EKG, radiologic studies,  jr8 

      plain films. Data interpreted: Pulse oximetry: on room air is 100 %. Interpretation:        

      normal. Counseling: I had a detailed discussion with the patient and/or guardian            

      regarding: the historical points, exam findings, and any diagnostic results supporting      

      the discharge/admit diagnosis, lab results, radiology results, the need for outpatient      

      follow up, a cardiologist, a family practitioner, to return to the emergency department     

      if symptoms worsen or persist or if there are any questions or concerns that arise at       

      home. ED course: Discussed case with patient. No cardiac, electrolyte, kidney, or liver     

      function. Patient feeling better. Started to diurese. Will add steroids at home for         

      dermatitis. Needs to f/u with PCP and Cardiology. Patient has been on no new meds that      

      would worsen his dermatologic condition. Patient good with this and will f/u. Knows to      

      come back if worse .                                                                        

                                                                                                  

                                                                                             

14:22 Order name: Basic Metabolic Panel                                                       [facility] 

                                                                                             

14:22 Order name: CBC with Diff                                                               [facility] 

                                                                                             

14:22 Order name: LFT's; Complete Time: 15:40                                                 [facility] 

                                                                                             

14:22 Order name: Magnesium; Complete Time: 15:40                                             [facility] 

                                                                                             

14:22 Order name: NT PRO-BNP; Complete Time: 15:40                                            [facility] 

                                                                                             

14:22 Order name: PT-INR; Complete Time: 15:40                                                [facility] 

                                                                                             

14:22 Order name: Troponin (emerg Dept Use Only); Complete Time: 15:40                        [facility] 

                                                                                             

14:22 Order name: XRAY Chest (1 view); Complete Time: 23:21                                   [facility] 

                                                                                             

14:22 Order name: EKG; Complete Time: 14:23                                                   [facility] 

                                                                                             

14:23 Order name: Basic Metabolic Panel; Complete Time: 15:40                                 [facility]

                                                                                             

14:23 Order name: CBC with Automated Diff; Complete Time: 15:09                               [facility]

                                                                                             

16:33 Order name: Urine Dipstick--Ancillary (enter results); Complete Time: 16:59             bd  

                                                                                             

14:22 Order name: Cardiac monitoring; Complete Time: 14:59                                    jr8 

                                                                                             

14:22 Order name: EKG - Nurse/Tech; Complete Time: 14:58                                                                                                                                   

14:22 Order name: IV Saline Lock; Complete Time: 1458                                                                                                                                     

14:22 Order name: Labs collected and sent; Complete Time:                                                                                              

14:22 Order name: O2 Per Protocol; Complete Time:                                                                                              

14:22 Order name: O2 Sat Monitoring; Complete Time: : 

                                                                                                  

ECG:                                                                                              

15:42 Rate is 89 beats/min. Rhythm is regular, Sinus Rhythm. QRS Axis is Normal. AR interval  jr8 

      is normal at 146 msec. QRS interval is normal at 68 msec. QT interval is normal at 366      

      msec. No Q waves. T waves are Flattened in leads III, aVF. No ST changes noted.             

      Clinical impression: NSR w/ Non-specific ST/T Changes. Interpreted by me. Reviewed by       

      me.                                                                                         

                                                                                                  

Administered Medications:                                                                         

14:58 Drug: Lasix 60 mg Route: IVP; Site: right antecubital;                                  hb  

14:59 Drug: SOLU-Medrol 125 mg Route: IVP; Site: right antecubital;                           hb  

                                                                                                  

                                                                                                  

Disposition:                                                                                      

19:57 Co-signature as Attending Physician, Homar Olson MD I agree with the assessment and  cindi 

      plan of care.                                                                               

                                                                                                  

Disposition:                                                                                      

21 16:46 Discharged to Home. Impression: Lower Extremity Edema, Dermatitis, unspecified.    

- Condition is Stable.                                                                            

- Discharge Instructions: Edema, Rash.                                                            

- Prescriptions for Prednisone 20 mg Oral Tablet - take 2 tablets by ORAL route once              

  daily for 7 days; 14 tablet.                                                                    

- Medication Reconciliation Form, Thank You Letter, Antibiotic Education, Prescription            

  Opioid Use form.                                                                                

- Follow up: Panchito Naranjo MD; When: 2 - 3 days; Reason: Recheck today's complaints,            

  Continuance of care, Re-evaluation by your physician.                                           

- Problem is new.                                                                                 

- Symptoms have improved.                                                                         

                                                                                                  

                                                                                                  

                                                                                                  

Signatures:                                                                                       

Dispatcher [employer]                           Homar Walker MD MD cha Smirch, Shelby, RN RN                                                      

Scout Lepe, PA                        PA   jr8                                                  

Dina Rand RN RN                                                      

Addie Batista RN                       RN   ll1                                                  

                                                                                                  

Corrections: (The following items were deleted from the chart)                                    

17:30 16:46 2021 16:46 Discharged to Home. Impression: Lower Extremity Edema;           ss  

      Dermatitis, unspecified. Condition is Stable. Forms are Medication Reconciliation Form,     

      Thank You Letter, Antibiotic Education, Prescription Opioid Use. Follow up: Panchito Naranjo; When: 2 - 3 days; Reason: Recheck today's complaints, Continuance of care,         

      Re-evaluation by your physician. Problem is new. Symptoms have improved. jr8                

                                                                                                  

**************************************************************************************************

## 2021-03-10 NOTE — EKG
Test Date:    2021-03-08               Test Time:    14:50:04

Technician:   HB                                     

                                                     

MEASUREMENT RESULTS:                                       

Intervals:                                           

Rate:         89                                     

MO:           146                                    

QRSD:         68                                     

QT:           366                                    

QTc:          445                                    

Axis:                                                

P:            58                                     

MO:           146                                    

QRS:          10                                     

T:            4                                      

                                                     

INTERPRETIVE STATEMENTS:                                       

                                                     

Normal sinus rhythm

Low voltage QRS

Possible Inferior infarct, age undetermined

Abnormal ECG

Compared to ECG 02/01/2020 16:01:28

Low QRS voltage now present

Myocardial infarct finding now present

T-wave abnormality no longer present



Electronically Signed On 03-10-21 05:03:52 CST by Panchito Naranjo

## 2022-02-04 ENCOUNTER — HOSPITAL ENCOUNTER (EMERGENCY)
Dept: HOSPITAL 97 - ER | Age: 77
Discharge: HOME | End: 2022-02-04
Payer: COMMERCIAL

## 2022-02-04 VITALS — OXYGEN SATURATION: 96 % | DIASTOLIC BLOOD PRESSURE: 63 MMHG | SYSTOLIC BLOOD PRESSURE: 100 MMHG | TEMPERATURE: 98.2 F

## 2022-02-04 DIAGNOSIS — U07.1: Primary | ICD-10-CM

## 2022-02-04 DIAGNOSIS — Z86.73: ICD-10-CM

## 2022-02-04 DIAGNOSIS — I10: ICD-10-CM

## 2022-02-04 DIAGNOSIS — J12.82: ICD-10-CM

## 2022-02-04 DIAGNOSIS — Z79.01: ICD-10-CM

## 2022-02-04 DIAGNOSIS — Z88.2: ICD-10-CM

## 2022-02-04 LAB — SARS-COV-2 RNA RESP QL NAA+PROBE: POSITIVE

## 2022-02-04 PROCEDURE — 96372 THER/PROPH/DIAG INJ SC/IM: CPT

## 2022-02-04 PROCEDURE — 71046 X-RAY EXAM CHEST 2 VIEWS: CPT

## 2022-02-04 PROCEDURE — 94640 AIRWAY INHALATION TREATMENT: CPT

## 2022-02-04 PROCEDURE — 0240U: CPT

## 2022-02-04 PROCEDURE — 99284 EMERGENCY DEPT VISIT MOD MDM: CPT

## 2022-02-04 NOTE — RAD REPORT
EXAM DESCRIPTION:  RAD - Chest Pa And Lat (2 Views) - 2/4/2022 2:30 pm

 

CLINICAL HISTORY:  Cough;SOB

Chest pain.

 

COMPARISON:  Chest Single View dated 3/8/2021; Chest Single View dated 10/29/2020; Chest Single View 
dated 2/1/2020; CHEST SINGLE VIEW dated 6/9/2015

 

FINDINGS:  Hazy opacities are present in both lung bases, greater on the left, likely representing in
filtrate/ pneumonia. The lungs are diffusely emphysematous. The heart is mildly enlarged in size. No 
displaced fractures.

## 2022-02-04 NOTE — ER
Nurse's Notes                                                                                     

 [facility]                                                                 

Name: Anthony Simental                                                                                

Age: 76 yrs                                                                                       

Sex: Male                                                                                         

: 1945                                                                                   

MRN: [id number]                                                                                   

Arrival Date: 2022                                                                          

Time: 13:13                                                                                       

Account#: [id number]                                                                            

Bed 19                                                                                            

Private MD: Ketan Messina T                                                                        

Diagnosis: Pneumonia due to SARS-associated coronavirus                                           

                                                                                                  

Presentation:                                                                                     

                                                                                             

13:22 Chief complaint: Patient states: cough, intermittent fever x 1 week, "I checked my      jl7 

      oxygen and it got down to 88%, but it bounces all around.". Coronavirus screen: Vaccine     

      status: Patient reports receiving the 2nd dose of the covid vaccine. Moderna cough          

      unrelated to allergies, fever, Client presents with at least one sign or symptom that       

      may indicate coronavirus-19. Standard/surgical mask placed on the client. Provider          

      contacted for isolation considerations. Ebola Screen: No symptoms or risks identified       

      at this time. Initial Sepsis Screen: Does the patient meet any 2 criteria? No.              

      Patient's initial sepsis screen is negative. Does the patient have a suspected source       

      of infection? No. Patient's initial sepsis screen is negative. Risk Assessment: Do you      

      want to hurt yourself or someone else? Patient reports no desire to harm self or            

      others. Onset of symptoms was 2022.                                            

13:22 Method Of Arrival: Ambulatory                                                           [facility] 

13:22 Acuity: JUAN C 3                                                                           jl7 

                                                                                                  

Triage Assessment:                                                                                

13:25 General: Appears in no apparent distress. uncomfortable, Behavior is calm, cooperative, jl7 

      appropriate for age. Pain: Denies pain.                                                     

                                                                                                  

Historical:                                                                                       

- Allergies:                                                                                      

13:25 Sulfa (Sulfonamide Antibiotics);                                                        jl7 

- Home Meds:                                                                                      

13:25 Warfarin Oral [Active];                                                                 jl7 

- PMHx:                                                                                           

13:25 CVA; Hypertension;                                                                      jl7 

                                                                                                  

- Immunization history:: Client reports receiving the 2nd dose of the Covid vaccine.              

- Social history:: Smoking status: Patient denies any tobacco usage or history of.                

                                                                                                  

                                                                                                  

Screenin:23 Abuse screen: Denies threats or abuse. Denies injuries from another. Nutritional        ha  

      screening: No deficits noted. Tuberculosis screening: No symptoms or risk factors           

      identified. Fall Risk None identified.                                                      

                                                                                                  

Assessment:                                                                                       

14:23 Reassessment: pt reported fever off and on x 1 week. General: Appears in no apparent    ha  

      distress. Respiratory: Reports cough that is productive.                                    

14:31 Respiratory: Breath sounds with crackles bilaterally.                                   ha  

                                                                                                  

Vital Signs:                                                                                      

13:22  / 63; Pulse 79; Resp 17; Temp 98.2; Pulse Ox 96% ; Weight 127.01 kg; Pain 0/10;  jl7 

                                                                                                  

ED Course:                                                                                        

13:13 Patient arrived in ED.                                                                  as  

13:13 Ketan Messina MD is Private Physician.                                                   as  

13:25 Triage completed.                                                                       jl7 

13:25 Arm band placed on right wrist.                                                         jl7 

13[facility]35 [facility]                                                           pm[facility] 

13:35 Garret Myers MD is Attending Physician.                                              pm1 

14:23 Patient has correct armband on for positive identification. Bed in low position.        ha  

14:23 No provider procedures requiring assistance completed.                                  hernandez  

14:30 Chest Pa And Lat (2 Views) XRAY In Process Unspecified.                                 EDMS

16:12 Ketan Messina MD is Referral Physician.                                                  pm1 

16:50 Patient did not have IV access during this emergency room visit.                        ha  

                                                                                                  

Administered Medications:                                                                         

14:22 Drug: Albuterol 2.5 mg Route: Inhalation;                                               ha  

14:22 Drug: Albuterol - atroVENT (ipratropium) (3:1) (2.5 mg - 0.5 mg) 3 ml Route: Nebulizer; ha  

14:22 Drug: SOLU-Medrol (methylPREDNISolone sodium succinate) 125 mg Route: IM; Site: right   ha  

      gluteus;                                                                                    

14:22 Drug: Tussionex Pennkinetic ER (chlorpheniramine-hydrocodone) Suspension 5 ml Route: PO;ha  

                                                                                                  

                                                                                                  

Outcome:                                                                                          

16:18 Discharge ordered by MD.                                                                pm1 

16:50 Discharged to home ambulatory.                                                          ha  

16:50 Condition: good                                                                             

16:50 Discharge instructions given to patient, Prescriptions given X 4.                           

16:50 Patient left the ED.                                                                    ha  

                                                                                                  

Signatures:                                                                                       

Dispatcher [employer]                           EDMS                                                 

Leidy Chao Patrick, NP                    NP   pm1                                                  

Ruben Grajeda RN RN   jl7                                                  

Dina Robles RN RN   hernandez                                                   

                                                                                                  

**************************************************************************************************

## 2022-02-04 NOTE — EDPHYS
Physician Documentation                                                                           

 [facility]                                                                 

Name: Anthony Simental                                                                                

Age: 76 yrs                                                                                       

Sex: Male                                                                                         

: 1945                                                                                   

MRN: [id number]                                                                                   

Arrival Date: 2022                                                                          

Time: 13:13                                                                                       

Account#: [id number]                                                                            

Bed 19                                                                                            

Private MD: Ketan Messina T                                                                        

ED Physician Garret Myers                                                                       

HPI:                                                                                              

                                                                                             

13:59 This 76 yrs old Male presents to ER via Ambulatory with complaints of covid symptoms.   pm1 

13:59 The patient or guardian reports cough. Onset: The symptoms/episode began/occurred 1     pm1 

      week(s) ago. Severity of symptoms: in the emergency department the symptoms are             

      actually worse. Modifying factors: The symptoms are alleviated by nothing, the symptoms     

      are aggravated by nothing. Associated signs and symptoms: Pertinent positives:              

      occasional fever, Pertinent negatives: chest pain, diarrhea, vomiting. The patient has      

      not experienced similar symptoms in the past. The patient has not recently seen a           

      physician. Patient's wife diagnosed with Covid.                                             

                                                                                                  

Historical:                                                                                       

- Allergies:                                                                                      

13:25 Sulfa (Sulfonamide Antibiotics);                                                        jl7 

- Home Meds:                                                                                      

13:25 Warfarin Oral [Active];                                                                 jl7 

- PMHx:                                                                                           

13:25 CVA; Hypertension;                                                                      jl7 

                                                                                                  

- Immunization history:: Client reports receiving the 2nd dose of the Covid vaccine.              

- Social history:: Smoking status: Patient denies any tobacco usage or history of.                

                                                                                                  

                                                                                                  

ROS:                                                                                              

13:59 Eyes: Negative for injury, pain, redness, and discharge, ENT: Negative for injury,      pm1 

      pain, and discharge, Cardiovascular: Negative for chest pain, palpitations, and edema.      

13:59 Back: Negative for injury and pain, MS/Extremity: Negative for injury and deformity,        

      Skin: Negative for injury, rash, and discoloration, Neuro: Negative for headache,           

      weakness, numbness, tingling, and seizure.                                                  

13:59 Constitutional: Positive for fever, Negative for body aches, poor PO intake.                

13:59 Respiratory: Positive for cough, shortness of breath.                                       

13:59 All other systems are negative.                                                             

                                                                                                  

Exam:                                                                                             

13:59 Constitutional:  This is a well developed, well nourished patient who is awake, alert,  pm1 

      and in no acute distress. Head/Face:  Normocephalic, atraumatic.                            

13:59 Skin:  Warm, dry with normal turgor.  Normal color with no rashes, no lesions, and no       

      evidence of cellulitis. MS/ Extremity:  Pulses equal, no cyanosis.  Neurovascular           

      intact.  Full, normal range of motion.                                                      

13:59 Cardiovascular: Exam negative for  acute changes, Rate: normal, Rhythm: regular,            

      Pulses: no pulse deficits are appreciated.                                                  

13:59 Respiratory: the patient does not display signs of respiratory distress,  Respirations:     

      no acute changes, Breath sounds: wheezing: expiratory that is mild, is heard diffusely.     

13:59 Neuro: Exam negative for acute changes, Orientation: is normal, Mentation: is normal,       

      Motor: is normal, moves all fours.                                                          

                                                                                                  

Vital Signs:                                                                                      

13:22  / 63; Pulse 79; Resp 17; Temp 98.2; Pulse Ox 96% ; Weight 127.01 kg; Pain 0/10;  jl7 

                                                                                                  

MDM:                                                                                              

13:43 Patient medically screened.                                                             pm1 

16:02 ED course: Patient with wheezing present on evaluation, therefore will treat with       pm1 

      steroids and breathing treatment.                                                           

16:11 Data reviewed: vital signs. Data interpreted: Pulse oximetry: on room air is 96 %.      pm1 

      Interpretation: normal. Counseling: I had a detailed discussion with the patient and/or     

      guardian regarding: the historical points, exam findings, and any diagnostic results        

      supporting the discharge/admit diagnosis, lab results, radiology results, the need for      

      outpatient follow up, to return to the emergency department if symptoms worsen or           

      persist or if there are any questions or concerns that arise at home.                       

                                                                                                  

04                                                                                             

13:51 Order name: COVID-19/FLU A+B (Document "Date of Onset" if Symptomatic); Complete Time:  pm1 

      16:02                                                                                       

02/04                                                                                             

13:51 Order name: Chest Pa And Lat (2 Views) XRAY; Complete Time: 15:17                       pm1 

                                                                                                  

Administered Medications:                                                                         

14:22 Drug: Albuterol 2.5 mg Route: Inhalation;                                               ha  

14:22 Drug: Albuterol - atroVENT (ipratropium) (3:1) (2.5 mg - 0.5 mg) 3 ml Route: Nebulizer; ha  

14:22 Drug: SOLU-Medrol (methylPREDNISolone sodium succinate) 125 mg Route: IM; Site: right   ha  

      gluteus;                                                                                    

14:22 Drug: Tussionex Pennkinetic ER (chlorpheniramine-hydrocodone) Suspension 5 ml Route: PO;ha  

                                                                                                  

                                                                                                  

Disposition:                                                                                      

17:07 Co-signature as Attending Physician, Garret Myers MD I agree with the assessment and   kdr 

      plan of care.                                                                               

                                                                                                  

Disposition Summary:                                                                              

22 16:18                                                                                    

Discharge Ordered                                                                                 

      Location: Home                                                                          pm1 

      Problem: new                                                                            pm1 

      Symptoms: have improved                                                                 pm1 

      Condition: Stable                                                                       pm1 

      Diagnosis                                                                                   

        - Pneumonia due to SARS-associated coronavirus                                        pm1 

      Followup:                                                                               pm1 

        - With: Emergency Department                                                               

        - When: As needed                                                                          

        - Reason: Worsening of condition                                                           

      Followup:                                                                               pm1 

        - With: Ketan Messina MD                                                                    

        - When: 2 - 3 days                                                                         

        - Reason: Recheck today's complaints, Continuance of care, Re-evaluation by your           

      physician                                                                                   

      Discharge Instructions:                                                                     

        - Discharge Summary Sheet                                                             pm1 

        - COVID-19                                                                            pm1 

        - COVID-19 Frequently Asked Questions                                                 pm1 

        - 10 Things You Can Do to Manage Your COVID-19 Symptoms at Home - [facility]                 pm1 

        - COVID-19: Quarantine vs. Isolation - [facility]                                            pm1 

      Forms:                                                                                      

        - Medication Reconciliation Form                                                      pm1 

        - Thank You Letter                                                                    pm1 

        - Antibiotic Education                                                                pm1 

        - Prescription Opioid Use                                                             pm1 

      Prescriptions:                                                                              

        - Ventolin HFA 90 mcg/actuation Inhalation HFA aerosol inhaler                             

            - inhale 1 puff by INHALATION route every 4-6 hours As needed; 1 Inhaler;         pm1 

      Refills: 0, Product Selection Permitted                                                     

        - Zithromax Z-Dave 250 mg Oral Tablet                                                       

            - take 1 tablet by ORAL route as directed for 5 days Day 1 - take two (2) tablets pm1 

      one time.  Day 2, 3, 4 , 5 take one (1) tablet once daily.; 6 tablet; Refills: 0,           

      Product Selection Permitted                                                                 

        - Guaifenesin AC  mg/5 mL Oral Liquid                                                

            - take 10 milliliters by ORAL route every 4 hours As needed; 240 milliliter;      pm1 

      Refills: 0, Product Selection Permitted                                                     

        - Prednisone 20 mg Oral Tablet                                                             

            - take 3 tablets by ORAL route once daily for 5 days; 15 tablet; Refills: 0,      pm1 

      Product Selection Permitted                                                                 

Signatures:                                                                                       

Dispatcher [employer]                           EDMS                                                 

Garret Myers MD MD kdr Marinas, Patrick, NP                    NP   pm1                                                  

Ruben Grajeda RN                        RN   jl7                                                  

Sheela-Dina Benton RN                  RN   hernandez                                                   

                                                                                                  

**************************************************************************************************

## 2022-11-18 LAB
BUN BLD-MCNC: 20 MG/DL (ref 7–18)
GLUCOSE SERPLBLD-MCNC: 107 MG/DL (ref 74–106)
HCT VFR BLD CALC: 39.3 % (ref 39.6–49)
INR BLD: 2.08
LYMPHOCYTES # SPEC AUTO: 1.4 K/UL (ref 0.7–4.9)
MCV RBC: 85.6 FL (ref 80–100)
PMV BLD: 7.3 FL (ref 7.6–11.3)
POTASSIUM SERPL-SCNC: 4.5 MMOL/L (ref 3.5–5.1)
RBC # BLD: 4.59 M/UL (ref 4.33–5.43)

## 2022-11-18 NOTE — RAD REPORT
EXAM DESCRIPTION:  RAD - Chest Pa And Lat (2 Views) - 11/18/2022 1:12 pm

 

CLINICAL HISTORY:  Pre op pending heart catheterization/carotid angio

 

COMPARISON:  Chest Pa And Lat (2 Views) dated 2/4/2022; Chest Single View dated 3/8/2021; Chest Singl
e View dated 10/29/2020; Chest Single View dated 2/1/2020

 

FINDINGS:  Lines: None.

Lungs: No evidence of edema or pneumonia.

Pleural: No significant pleural effusions or pneumothorax.

Cardiac: The heart size is within normal limits.

Mediastinum: Within normal limits.

Bones: No acute fractures.

Other: None

 

IMPRESSION:  No acute cardiopulmonary disease.

## 2022-11-21 ENCOUNTER — HOSPITAL ENCOUNTER (OUTPATIENT)
Dept: HOSPITAL 97 - CCL | Age: 77
Discharge: HOME | End: 2022-11-21
Attending: INTERNAL MEDICINE
Payer: COMMERCIAL

## 2022-11-21 VITALS — DIASTOLIC BLOOD PRESSURE: 77 MMHG | SYSTOLIC BLOOD PRESSURE: 153 MMHG | OXYGEN SATURATION: 99 %

## 2022-11-21 DIAGNOSIS — I10: ICD-10-CM

## 2022-11-21 DIAGNOSIS — I65.23: ICD-10-CM

## 2022-11-21 DIAGNOSIS — E78.5: ICD-10-CM

## 2022-11-21 DIAGNOSIS — Z88.2: ICD-10-CM

## 2022-11-21 DIAGNOSIS — I25.10: Primary | ICD-10-CM

## 2022-11-21 DIAGNOSIS — Z87.891: ICD-10-CM

## 2022-11-21 PROCEDURE — 93005 ELECTROCARDIOGRAM TRACING: CPT

## 2022-11-21 PROCEDURE — 85347 COAGULATION TIME ACTIVATED: CPT

## 2022-11-21 PROCEDURE — 76937 US GUIDE VASCULAR ACCESS: CPT

## 2022-11-21 PROCEDURE — 36415 COLL VENOUS BLD VENIPUNCTURE: CPT

## 2022-11-21 PROCEDURE — 85730 THROMBOPLASTIN TIME PARTIAL: CPT

## 2022-11-21 PROCEDURE — 71046 X-RAY EXAM CHEST 2 VIEWS: CPT

## 2022-11-21 PROCEDURE — 36222 PLACE CATH CAROTID/INOM ART: CPT

## 2022-11-21 PROCEDURE — 93454 CORONARY ARTERY ANGIO S&I: CPT

## 2022-11-21 PROCEDURE — 80048 BASIC METABOLIC PNL TOTAL CA: CPT

## 2022-11-21 PROCEDURE — 85610 PROTHROMBIN TIME: CPT

## 2022-11-21 PROCEDURE — 85025 COMPLETE CBC W/AUTO DIFF WBC: CPT

## 2022-11-21 NOTE — OP
Date of Procedure:  11/21/2022



Surgeon:  EMMANUEL MCCULLOUGH



Procedures Performed:  

1.Selective coronary angiogram.

2.Bilateral carotid angiogram.

3.Percutaneous coronary intervention of severe mid left anterior descending/diagonal 1 branch, bifur
cating stenting used 3.0 x 28 mm drug-eluting stent in the left anterior descending and 2.75 x 20 mm 
drug-eluting stent in D1 in a reversed culotte technique with bilateral inflation kissing balloons.  
I used 2.75 x 20 mm NC balloon and both arteries with excellent angiographic results.



Indication:  

1.Abnormal stress test with chest pain.

2.Carotid stenosis by Doppler with history of TIAs.



Access:  Right femoral artery 6-Nauruan closed with 6-Nauruan Angio-Seal.



Complications:  None.



Anesthesia:  Total sedation time was 75 minutes.  Used fentanyl, Versed.



Estimated Blood Loss:  Bleeding less than 50 mL.



Total Contrast Used:  130 mL.



Description Of Procedure:  After risks, benefits, alternatives were explained, patient agreed to proc
edure and signed informed consent.  Patient was brought in to cardiac catheterization laboratory, pre
pped and draped in usual sterile fashion.  Then, I accessed the right femoral artery using micropunct
ure kit and ultrasound guidance and fluoroscopy, placed a 4-Nauruan [place] sheath and took a 4-Frenc
h 3DRC catheter into the aortic root, engaged the right common carotid and then the left common carot
id and took standard views and exchanged for a 4-Nauruan JL4 catheter, engaged left main, took standar
d views, and exchanged for 6-Nauruan JR4 catheter, engaged the RCA, took standard views and then decid
ed to intervene on the LAD. 



Intervention Details:  We gave systemic heparin to assure ACT level above 250, gave 600 mg of Plavix 
and 325 mg aspirin and then I took a 6-Nauruan EBU 3.5 guide into the aortic root, engaged left main. 
 I took a short Runthrough wire into the LAD and another Runthrough wire into the diagonal branch.  P
re-dilated the LAD lesion and expanded very well and placed 3.0 x 28 mm stent.  The diagonal branch o
stially was significantly.  It had initially about 60% to 70% and became almost 90% and decided to do
 reverse culotte.  I took a balloon, dilated the diagonal with good results, and then placed 2.75 x 2
0 mm Synergy drug-eluting stent and then did bilateral inflation kissing with excellent results angio
graphically.  I removed the wire and took final angiogram.  It was satisfactory.  I removed the guide
 and the sheath and placed a 6-Nauruan Angio-Seal for closure with good hemostasis.  Patient was sent 
into recovery in a stable condition.



Findings:  

1.Left main is large and normal.

2.LAD mid 80% stenosis at the bifurcation of diagonal 1 branch.  Diagonal branch has 60% to 70%, whi
ch became worse after stenting the LAD.  Status post bifurcation stenting and bilateral kissing infla
tion as above.  Within the LAD, luminal irregularities and both diagonal branches appears okay after 
the ostium of the D1 stenosis.

3.Left circumflex, moderate size vessel and normal. 

4.RCA is large and dominant with mid 30% stenosis mid to distal. 

Carotid angiogram:

1.Right common carotid artery is normal, the right internal carotid artery has 50% stenosis proximal
ly, and the right external carotid artery appears normal.

2.Left common carotid artery is normal and then, the left internal carotid artery has about 50% sten
osis proximally and the left external carotid artery is normal.



Conclusion:  

1.Severe mid LAD/diagonal 1 branch stenosis, status post bifurcation stenting as outlined above succ
essfully.

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

3.Moderate bilateral carotid stenosis, which we treated medically.



Plan:  Aspirin, Plavix, high-dose statin.  Follow up with me in the office in 1 week.





SR/KYLER

DD:  11/21/2022 15:52:41Voice ID:  [id number]

DT:  11/21/2022 21:44:02Report ID:  [id number]

## 2022-11-21 NOTE — EKG
Test Date:    2022-11-18               Test Time:    12:31:28

Technician:   AN                                     

                                                     

MEASUREMENT RESULTS:                                       

Intervals:                                           

Rate:         63                                     

GA:           170                                    

QRSD:         74                                     

QT:           322                                    

QTc:          329                                    

Axis:                                                

P:            12                                     

GA:           170                                    

QRS:          10                                     

T:            113                                    

                                                     

INTERPRETIVE STATEMENTS:                                       

                                                     

Sinus rhythm with occasional premature ventricular complexes

Low voltage QRS

Inferior infarct, age undetermined

Abnormal ECG

Compared to ECG 03/08/2021 14:50:04

Ventricular premature complex(es) now present

Myocardial infarct finding still present



Electronically Signed On 11-21-22 15:32:01 CST by Moise Padilla